# Patient Record
Sex: MALE | Race: WHITE | NOT HISPANIC OR LATINO | Employment: FULL TIME | URBAN - METROPOLITAN AREA
[De-identification: names, ages, dates, MRNs, and addresses within clinical notes are randomized per-mention and may not be internally consistent; named-entity substitution may affect disease eponyms.]

---

## 2017-03-06 ENCOUNTER — OFFICE VISIT (OUTPATIENT)
Dept: OTOLARYNGOLOGY | Facility: CLINIC | Age: 37
End: 2017-03-06

## 2017-03-06 VITALS — WEIGHT: 255 LBS | HEIGHT: 70 IN | BODY MASS INDEX: 36.51 KG/M2

## 2017-03-06 DIAGNOSIS — R09.81 NASAL CONGESTION: ICD-10-CM

## 2017-03-06 DIAGNOSIS — R49.0 DYSPHONIA: ICD-10-CM

## 2017-03-06 DIAGNOSIS — R07.0 THROAT DISCOMFORT: Primary | ICD-10-CM

## 2017-03-06 DIAGNOSIS — R49.0 DYSPHONIA: Primary | ICD-10-CM

## 2017-03-06 ASSESSMENT — PAIN SCALES - GENERAL: PAINLEVEL: NO PAIN (0)

## 2017-03-06 NOTE — LETTER
3/6/2017      RE: Jhon Gu  [address]    [place] [address]       Dear Colleague:    I had the pleasure of meeting Jhon Gu in consultation at the [facility] of the [facility] on a self-referred basis, for evaluation of dysphonia. The note from our visit follows.  I appreciate the opportunity to participate in the care of this pleasant patient.    Please feel free to contact me with any questions.    Sincerely yours,    Calli Steward M.D., M.P.H.  , Laryngology  Director, [facility]  Otolaryngology- Head & Neck Surgery  [phone]          =====    History of Present Illness:   Jhon Gu is a pleasant 36-year-old male galdamez who presents with a several month history of sinonasal congestion and dysphonia.     I last saw him in 2011 with post-upper respiratory infection symptoms. In the interim he was seen by Dr. 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    He also has concerns about sinonasal symptoms. He had sinus infections, in November and February, the first of which he managed conservatively, and the second of which was treated with Augmentin. Recently his primary care physician suggested resuming Flonase, and increasing Claritin dosing after he had a rash that was thought to potentially be related to the Augmentin (but not conclusively so). He still has a sense of congestion and nasal discomfort and is interested in getting further help on this, because it is affecting his singing.      Voice  He states his voice feels lower than it used to be. Both speaking and singing feel heavier, but not painful. Voice quality seems preserved, but there is some buzziness and some goopiness that he notices.      Swallowing  No concerns.       Cough/Throat-clearing  No concerns, although he sometimes needs to clear his throat due to  secretions.      Breathing  No concerns.       Throat discomfort  No concerns.       Reflux-type symptoms  He experiences heartburn/indigestion rarely. He is not taking reflux medications.      Prior EPIC records were reviewed for this visit.    MEDICATIONS:     Current Outpatient Prescriptions   Medication Sig Dispense Refill     Multiple Vitamins-Minerals (MULTIVITAMIN ADULT PO)        fluticasone (FLONASE) 50 MCG/ACT nasal spray Spray 2 sprays into both nostrils daily       loratadine (CLARITIN) 10 MG tablet Take 10 mg by mouth daily       ibuprofen (ADVIL) 200 MG tablet Take 200 mg by mouth every 4 hours as needed.         ALLERGIES:    Allergies   Allergen Reactions     Latex      Nkda [No Known Drug Allergies]      Penicillins Rash       PAST MEDICAL HISTORY:   Past Medical History   Diagnosis Date     Chronic sinusitis 11/16        PAST SURGICAL HISTORY: No past surgical history on file.    HABITS/SOCIAL HISTORY:    Social History   Substance Use Topics     Smoking status: Never Smoker     Smokeless tobacco: Never Used     Alcohol use Yes      Comment: 1-2 drinks twice weekly         FAMILY HISTORY:    Family History   Problem Relation Age of Onset     DIABETES Mother      Hypertension Mother        REVIEW OF SYSTEMS:  The patient completed a comprehensive 11 point review of systems (below), which was reviewed. Positives are as noted below; pertinent findings are as noted in the HPI.     Patient Supplied Answers to Review of Systems   ENT ROS 2/23/2017   Constitutional Unexplained fatigue, Problems with sleep   Neurology Headache   Ears, Nose, Throat Ringing/noise in ears, Nasal congestion or drainage, Sore throat   Cardiopulmonary Wheezing   Allergy/Immunology Allergies or hay fever       PHYSICAL EXAMINATION:  General: The patient was alert and conversant, and in no acute distress.    Eyes: PERRL, conjunctiva and lids normal, sclera nonicteric.  Nose: Anterior rhinoscopy: no gross abnormalities. no   bleeding; no  mucopurulence; septum grossly normal, mild to moderate mucoid drainage and/or crusting.  Oral cavity/oropharynx: No masses or lesions. Dentition in good condition. Floor of mouth and oral tongue soft to palpation. Tongue mobility and palate elevation intact and symmetric.  Ears: Normal auricles, external auditory canals bilaterally. Visualized portions of tympanic membranes normal bilaterally.   Neck: No palpable cervical lymphadenopathy. There was no significant tenderness to palpation of the thyrohyoid space. No obvious thyroid abnormality. Landmarks palpable.  Resp: Breathing comfortably, no stridor or stertor.  Neuro: Symmetric facial function. Other cranial nerves as documented above.  Psych: Normal affect, pleasant and cooperative.  Voice/speech: No significant dysphonia of speaking voice.  Extremities: No cyanosis, clubbing, or edema of the upper extremities.    Intake scores  Total Score for Last Patient-Answered VHI Questionnaire  VHI Total Score 2/23/2017   VHI Total Score 3     Total Score for Last Patient-Answered EAT Questionnaire  EAT Total Score 2/23/2017   EAT Total Score 0     Total Score for Last Patient-Answered CSI Questionnaire  CSI Total Score 2/23/2017   CSI Total Score 7         PROCEDURE:   Flexible fiberoptic laryngoscopy and laryngovideostroboscopy  Indications: This procedure was warranted to evaluate the patient's laryngeal function. Risks, benefits, and alternatives were discussed.  Description: After written informed consent was obtained, a time-out was performed to confirm patient identity, procedure, and procedure site. Topical 3% lidocaine with 0.25% phenylephrine was applied to the nasal cavities. I performed the endoscopy and no complications were apparent. Continuous and stroboscopic light were utilized to assess routine phonation and variable frequency phonation.  Performed by: Calli Steward MD MPH  SLP: Kira Mayers, PhD, CCC-SLP  Findings: Normal  nasopharynx. Normal base of tongue, valleculae, and epiglottis. Vocal fold mobility: right: normal; left: normal. Medial edges of the vocal folds: smooth and straight. No focal mucosal lesions were observed on the true vocal folds. Glissade produced appropriate elongation. There was mild supraglottic recruitment with connected speech. Mucosa of false vocal folds, aryepiglottic folds, piriform sinuses, and posterior glottis unremarkable. Airway was patent. No focal findings on NBI; mild mucosal thickening mid bilateral true vocal folds.    The addition of stroboscopy allowed evaluation of the mucosal wave.   Amplitude: right: normal; left: normal. Symmetry: good symmetry. Closure pattern: normal at modal, hourglass shaped at high pitches. Closure plane: at glottic level. Phase distribution: normal.      IMPRESSION AND PLAN:   Jhon Gu presents with a stable laryngeal exam and scattered thick, sticky secretions. My sense is that his sinonasal symptoms are associated with a post-acute-sinusitis gradual recovery, but because the symptoms are affecting his singing, I recommended that he continue the Flonase and do daily saline nasal rinses as well as gargles. I will also make a referral to Dr. Milton for rhinosinusitis, in the event that his symptoms persist. He is also concerned about the potential role of environmental allergies in his symptoms, and therefore wished to be seen by an allergist, so we will place that referral as well. I suggested that he avoid taking double doses of Claritin at this point, as his rash has resolved and I think the Claritin at this point is leading to more dryness and thickness of his secretions.     He will return as needed. I appreciate the opportunity to participate in the care of this pleasant patient.       Calli Steward MD

## 2017-03-06 NOTE — PATIENT INSTRUCTIONS
Plan of Care:  1. Please make an appt to see Dr. Milton for sinus congestion.  2. Please make an appt to see an allergist for possible allergy testing.    Clinic Information:  1. To schedule an appointment please call [phone], option 1  2. To talk to a Triage RN please call [phone] select option 3  3. To speak to Dr. Steward's nurse, Amira, please call [phone]    Amira Gill RN  [facility]   Head & Neck Surgery

## 2017-03-06 NOTE — MR AVS SNAPSHOT
After Visit Summary   3/6/2017    Jhon Gu    MRN: [id number]           Patient Information     Date Of Birth          1980        Visit Information        Provider Department      3/6/2017 1:20 PM Kira Mayers, SLP [facility]        Today's Diagnoses     Dysphonia    -  1       Follow-ups after your visit        Your next 10 appointments already scheduled     May 01, 2017  3:35 PM CDT   (Arrive by 3:20 PM)   New Allergy with Joon Gill MD   [facility] ([facility])    [address] [place] [id number] [phone]           Do not take anti-histamines or Zantac for seven day prior to your appointment.            May 01, 2017  5:00 PM CDT   (Arrive by 4:45 PM)   NEW NOSE with Jordyn Milton MD   [facility] ([facility])    [address] [place] [id number] [phone]              Who to contact     Please call your clinic at [phone] to:    Ask questions about your health    Make or cancel appointments    Discuss your medicines    Learn about your test results    Speak to your doctor   If you have compliments or concerns about an experience at your clinic, or if you wish to file a complaint, please contact [facility] Patient Relations at [phone] or email us at [email or web address]         Additional Information About Your Visit        [employer] Information     [employer] gives you secure access to your electronic health record. If you see a primary care provider, you can also send messages to your care team and make appointments. If you have questions, please call your primary care clinic.  If you do not have a primary care provider, please call [phone] and they will assist you.      [employer] is an electronic gateway that provides easy, online access to your medical  records. With [employer], you can request a clinic appointment, read your test results, renew a prescription or communicate with your care team.     To access your existing account, please contact your [facility] or call [phone] for assistance.        Care EveryWhere ID     This is your Care EveryWhere ID. This could be used by other organizations to access your [place] medical records  [id number]         Blood Pressure from Last 3 Encounters:   05/02/14 133/76    Weight from Last 3 Encounters:   03/06/17 115.7 kg (255 lb)   06/05/14 114.3 kg (252 lb)   04/24/14 111.6 kg (246 lb)              We Performed the Following     [facility]        Primary Care Provider Fax #    Leigha Jackman [phone]       [facility] [address] [address]        Thank you!     Thank you for choosing [employer]  for your care. Our goal is always to provide you with excellent care. Hearing back from our patients is one way we can continue to improve our services. Please take a few minutes to complete the written survey that you may receive in the mail after your visit with us. Thank you!             Your Updated Medication List - Protect others around you: Learn how to safely use, store and throw away your medicines at www.disposemymeds.org.          This list is accurate as of: 3/6/17 11:59 PM.  Always use your most recent med list.                   Brand Name Dispense Instructions for use    ADVIL 200 MG tablet   Generic drug:  ibuprofen      Take 200 mg by mouth every 4 hours as needed.       CLARITIN 10 MG tablet   Generic drug:  loratadine      Take 10 mg by mouth daily       fluticasone 50 MCG/ACT spray    FLONASE     Spray 2 sprays into both nostrils daily       MULTIVITAMIN ADULT PO

## 2017-03-06 NOTE — NURSING NOTE
Procedure: Upper aerodigestive tract endoscopy, Flexible or rigid laryngoscopy, possible stroboscopy, possible flexible endoscopic evaluation of swallowing   Reason: symptoms requiring examination   PREPROCEDURE:   Yes Patient ID verified with 2 identifiers (name and  or MRN)   Yes: Procedure and site verified with patient/designee (when able)   Yes: Accurate consent documentation in medical record   No: Marking not required. Reason: [ Procedure does not require site marking. ][ Provider is in continuous attendance with the patient from consent through completion of procedure. ][ Marking unable or refused by patient (see scanned diagram).   TIME OUT:   Yes: Time-Out performed immediately prior to starting procedure, including verbal and active participation of all team members addressing:   * Correct patient identity   * Confirmed that the correct side and site are marked   * An accurate procedure consent form   * Agreement on the procedure to be done   * Correct patient position   * Relevant images and results are properly labeled and appropriately displayed   * The need to administer antibiotics or fluids for irrigation purposes during the procedure as applicable   * Safety precations based on patient history or medication use   DURING PROCEDURE: Verification of correct person, site, and procedure occurs any time the responsibility for care of the patient is transferred to another member of the care team.   Amira Gill RN  P Otolaryngology/Head & Neck Surgery

## 2017-03-06 NOTE — PROGRESS NOTES
Dear Colleague:    I had the pleasure of meeting Jhon Gu in consultation at the [facility] of the [facility] on a self-referred basis, for evaluation of dysphonia. The note from our visit follows.  I appreciate the opportunity to participate in the care of this pleasant patient.    Please feel free to contact me with any questions.    Sincerely yours,    Calli Steward M.D., M.P.H.  , Laryngology  Director, [facility]  Otolaryngology- Head & Neck Surgery  [phone]          =====    History of Present Illness:   Jhon Gu is a pleasant 36-year-old male galdamez who presents with a several month history of sinonasal congestion and dysphonia.     I last saw him in 2011 with post-upper respiratory infection symptoms. In the interim he was seen by Dr. 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    He also has concerns about sinonasal symptoms. He had sinus infections, in November and February, the first of which he managed conservatively, and the second of which was treated with Augmentin. Recently his primary care physician suggested resuming Flonase, and increasing Claritin dosing after he had a rash that was thought to potentially be related to the Augmentin (but not conclusively so). He still has a sense of congestion and nasal discomfort and is interested in getting further help on this, because it is affecting his singing.      Voice  He states his voice feels lower than it used to be. Both speaking and singing feel heavier, but not painful. Voice quality seems preserved, but there is some buzziness and some goopiness that he notices.      Swallowing  No concerns.       Cough/Throat-clearing  No concerns, although he sometimes needs to clear his throat due to secretions.      Breathing  No concerns.       Throat discomfort  No concerns.       Reflux-type  symptoms  He experiences heartburn/indigestion rarely. He is not taking reflux medications.      Prior EPIC records were reviewed for this visit.    MEDICATIONS:     Current Outpatient Prescriptions   Medication Sig Dispense Refill     Multiple Vitamins-Minerals (MULTIVITAMIN ADULT PO)        fluticasone (FLONASE) 50 MCG/ACT nasal spray Spray 2 sprays into both nostrils daily       loratadine (CLARITIN) 10 MG tablet Take 10 mg by mouth daily       ibuprofen (ADVIL) 200 MG tablet Take 200 mg by mouth every 4 hours as needed.         ALLERGIES:    Allergies   Allergen Reactions     Latex      Nkda [No Known Drug Allergies]      Penicillins Rash       PAST MEDICAL HISTORY:   Past Medical History   Diagnosis Date     Chronic sinusitis 11/16        PAST SURGICAL HISTORY: No past surgical history on file.    HABITS/SOCIAL HISTORY:    Social History   Substance Use Topics     Smoking status: Never Smoker     Smokeless tobacco: Never Used     Alcohol use Yes      Comment: 1-2 drinks twice weekly         FAMILY HISTORY:    Family History   Problem Relation Age of Onset     DIABETES Mother      Hypertension Mother        REVIEW OF SYSTEMS:  The patient completed a comprehensive 11 point review of systems (below), which was reviewed. Positives are as noted below; pertinent findings are as noted in the HPI.     Patient Supplied Answers to Review of Systems   ENT ROS 2/23/2017   Constitutional Unexplained fatigue, Problems with sleep   Neurology Headache   Ears, Nose, Throat Ringing/noise in ears, Nasal congestion or drainage, Sore throat   Cardiopulmonary Wheezing   Allergy/Immunology Allergies or hay fever       PHYSICAL EXAMINATION:  General: The patient was alert and conversant, and in no acute distress.    Eyes: PERRL, conjunctiva and lids normal, sclera nonicteric.  Nose: Anterior rhinoscopy: no gross abnormalities. no  bleeding; no  mucopurulence; septum grossly normal, mild to moderate mucoid drainage and/or  crusting.  Oral cavity/oropharynx: No masses or lesions. Dentition in good condition. Floor of mouth and oral tongue soft to palpation. Tongue mobility and palate elevation intact and symmetric.  Ears: Normal auricles, external auditory canals bilaterally. Visualized portions of tympanic membranes normal bilaterally.   Neck: No palpable cervical lymphadenopathy. There was no significant tenderness to palpation of the thyrohyoid space. No obvious thyroid abnormality. Landmarks palpable.  Resp: Breathing comfortably, no stridor or stertor.  Neuro: Symmetric facial function. Other cranial nerves as documented above.  Psych: Normal affect, pleasant and cooperative.  Voice/speech: No significant dysphonia of speaking voice.  Extremities: No cyanosis, clubbing, or edema of the upper extremities.    Intake scores  Total Score for Last Patient-Answered VHI Questionnaire  VHI Total Score 2/23/2017   VHI Total Score 3     Total Score for Last Patient-Answered EAT Questionnaire  EAT Total Score 2/23/2017   EAT Total Score 0     Total Score for Last Patient-Answered CSI Questionnaire  CSI Total Score 2/23/2017   CSI Total Score 7         PROCEDURE:   Flexible fiberoptic laryngoscopy and laryngovideostroboscopy  Indications: This procedure was warranted to evaluate the patient's laryngeal function. Risks, benefits, and alternatives were discussed.  Description: After written informed consent was obtained, a time-out was performed to confirm patient identity, procedure, and procedure site. Topical 3% lidocaine with 0.25% phenylephrine was applied to the nasal cavities. I performed the endoscopy and no complications were apparent. Continuous and stroboscopic light were utilized to assess routine phonation and variable frequency phonation.  Performed by: Calli Steward MD MPH  SLP: Kira Mayers, PhD, CCC-SLP  Findings: Normal nasopharynx. Normal base of tongue, valleculae, and epiglottis. Vocal fold mobility: right:  normal; left: normal. Medial edges of the vocal folds: smooth and straight. No focal mucosal lesions were observed on the true vocal folds. Glissade produced appropriate elongation. There was mild supraglottic recruitment with connected speech. Mucosa of false vocal folds, aryepiglottic folds, piriform sinuses, and posterior glottis unremarkable. Airway was patent. No focal findings on NBI; mild mucosal thickening mid bilateral true vocal folds.    The addition of stroboscopy allowed evaluation of the mucosal wave.   Amplitude: right: normal; left: normal. Symmetry: good symmetry. Closure pattern: normal at modal, hourglass shaped at high pitches. Closure plane: at glottic level. Phase distribution: normal.      IMPRESSION AND PLAN:   Jhon Gu presents with a stable laryngeal exam and scattered thick, sticky secretions. My sense is that his sinonasal symptoms are associated with a post-acute-sinusitis gradual recovery, but because the symptoms are affecting his singing, I recommended that he continue the Flonase and do daily saline nasal rinses as well as gargles. I will also make a referral to Dr. Milton for rhinosinusitis, in the event that his symptoms persist. He is also concerned about the potential role of environmental allergies in his symptoms, and therefore wished to be seen by an allergist, so we will place that referral as well. I suggested that he avoid taking double doses of Claritin at this point, as his rash has resolved and I think the Claritin at this point is leading to more dryness and thickness of his secretions.     He will return as needed. I appreciate the opportunity to participate in the care of this pleasant patient.

## 2017-03-06 NOTE — MR AVS SNAPSHOT
After Visit Summary   3/6/2017    Jhon Gu    MRN: [id number]           Patient Information     Date Of Birth          1980        Visit Information        Provider Department      3/6/2017 1:20 PM Calli Steward MD [facility]        Today's Diagnoses     Throat discomfort    -  1    Dysphonia        Nasal congestion          Care Instructions    Plan of Care:  1. Please make an appt to see Dr. Milton for sinus congestion.  2. Please make an appt to see an allergist for possible allergy testing.    Clinic Information:  1. To schedule an appointment please call [phone], option 1  2. To talk to a Triage RN please call [phone] select option 3  3. To speak to Dr. Steward's nurse, Amira, please call [phone]    Amira Gill RN  [facility] ENT   Head & Neck Surgery             Follow-ups after your visit        Additional Services     ALLERGY/ASTHMA ADULT REFERRAL       Your provider has referred you to: Dr. Gill [facility]     Please be aware that coverage of these services is subject to the terms and limitations of your health insurance plan.  Call member services at your health plan with any benefit or coverage questions.      Please bring the following with you to your appointment:    (1) Any X-Rays, CTs or MRIs which have been performed.  Contact the facility where they were done to arrange for  prior to your scheduled appointment.    (2) List of current medications  (3) This referral request   (4) Any documents/labs given to you for this referral                  Your next 10 appointments already scheduled     May 01, 2017  3:35 PM CDT   (Arrive by 3:20 PM)   New Allergy with Joon Gill MD   [facility] ([facility])    [address] [place] [address] [phone]           Do not take anti-histamines or Zantac for seven day prior  "to your appointment.            May 01, 2017  5:00 PM CDT   (Arrive by 4:45 PM)   NEW NOSE with Jordyn Milton MD   [facility] ([facility] [place])    [address] [address] [phone]              Who to contact     Please call your clinic at [phone] to:    Ask questions about your health    Make or cancel appointments    Discuss your medicines    Learn about your test results    Speak to your doctor   If you have compliments or concerns about an experience at your clinic, or if you wish to file a complaint, please contact [facility] Patient Relations at [phone] or email us at [email or web address]         Additional Information About Your Visit        [employer] Information     Kalido gives you secure access to your electronic health record. If you see a primary care provider, you can also send messages to your care team and make appointments. If you have questions, please call your primary care clinic.  If you do not have a primary care provider, please call [phone] and they will assist you.      Kalido is an electronic gateway that provides easy, online access to your medical records. With Kalido, you can request a clinic appointment, read your test results, renew a prescription or communicate with your care team.     To access your existing account, please contact your [facility] or call [phone] for assistance.        Care EveryWhere ID     This is your Care EveryWhere ID. This could be used by other organizations to access your [facility] medical records  [id number]        Your Vitals Were     Height BMI (Body Mass Index)                1.79 m (5' 10.47\") 36.1 kg/m2           Blood Pressure from Last 3 Encounters:   05/02/14 133/76    Weight from Last 3 Encounters:   03/06/17 115.7 kg (255 lb)   06/05/14 114.3 kg (252 lb)   04/24/14 111.6 kg (246 lb) "              We Performed the Following     ALLERGY/ASTHMA ADULT REFERRAL     IMAGESTREAM RECORDING ORDER        Primary Care Provider Fax #    Leigha Jackman [phone]       [place] [address]        Thank you!     Thank you for choosing [facility]  for your care. Our goal is always to provide you with excellent care. Hearing back from our patients is one way we can continue to improve our services. Please take a few minutes to complete the written survey that you may receive in the mail after your visit with us. Thank you!             Your Updated Medication List - Protect others around you: Learn how to safely use, store and throw away your medicines at www.disposemymeds.org.          This list is accurate as of: 3/6/17  2:20 PM.  Always use your most recent med list.                   Brand Name Dispense Instructions for use    ADVIL 200 MG tablet   Generic drug:  ibuprofen      Take 200 mg by mouth every 4 hours as needed.       CLARITIN 10 MG tablet   Generic drug:  loratadine      Take 10 mg by mouth daily       fluticasone 50 MCG/ACT spray    FLONASE     Spray 2 sprays into both nostrils daily       MULTIVITAMIN ADULT PO

## 2017-03-21 NOTE — PROGRESS NOTES
"[facility] VOICE [facility]  Negro Camacho Jr., M.D., F.A.C.S.  Calli Steward M.D., M.P.H.  Kira Mayers, Ph.D., CCC/SLP  Naz Claire M.M. (voice), M.MARIAN., CCC/SLP  Kilo Maynard M.M. (voice), M.A., [facility]/SLP    [facility]  VOICE/SPEECH/BREATHING EVALUATION AND LARYNGEAL EXAMINATION REPORT    Patient: Jhon Gu  Date of Visit: 3/6/2017    HISTORY  PATIENT INFORMATION  Jhon Gu was seen for voice evaluation and laryngeal examination in conjunction with a visit to Dr. Steward today.  Please refer to Dr. Steward s dictation for addtional history and impressions.     DIAGNOSIS/REASON FOR REFERRAL  Dysphonia/ Evaluate, perform laryngeal exam, treat as appropriate    HISTORY OF VOICE DISORDER  Mr. Gu is a 36 year old galdamez with a history of dysphonia.  Salient details of his history are as follows:    Hx of dysphonia and treatment in this clinic in 2015; apparently no voice problems since then until recently    nasal congestion with voice consequences since November 2016  o Dx'd with sinus infection, but no medical treatment  o Apparently resolved for a while  o Recurrence of sinus infection in February; Rx'd Augmentin from Urgent Care clinic  o Seen by his PCP at [place] a week ago  - She increased his dose of Claritin and also recommended he return to use of Flonase  - He developed a rash and severe dysphonia as the Claritin was wearing off; very concerning to him  - The Claritin seems to be helping the congesting, but the drying effects are problematic    Concerned for health care in the presence of needing to maintain optimal vocal health    Currently voice is doing well despite last week's dysphonia; now concerned about treating the nasal congestion without affecting his voice  o Feels that his voice is now the victim of his congestions  o Voice is lower when he wakes up, and everything feel \"heavier\" but he believes it is due to congestion    CURRENT PATIENT COMPLAINTS    Primary: " dysphonia due to nasal congestion and drainage    Denies significant dyspnea, dysphagia, or pain    OTHER PERTINENT HISTORY    Unremarkable    OBJECTIVE FINDINGS  VOICE/ SPEECH/ NON-COMMUNICATIVE LARYNGEAL BEHAVIORS EVALUATION  An evaluation of the voice was accomplished today; salient features are as follows:    Breathing pattern:    Appears within normal limits and adequate     No overt tension is evident.    Voice quality is characterized by    Clear quality    No roughness, breathiness, or strain    Minimal glottal hare    Habitual pitch was not formally tested, but is judged to be higher than optimal, with a slightly unnatural use of tenor quality and high pitch range    Loudness is WNL and appropriate for the setting    Sustained vowels:     Comfortable pitch /a/: clear, resonant, robust; high pitch    A singing task shows voice quality is consistent between speech and singing, with a slight tongue-base tension and backward focus consistent with his style of singing    In a pitch glide task to determine pitch range, he demonstrates supranormal pitch range    CAPE-V Overall Severity:   0/100    LARYNGEAL EXAMINATION    Endoscopic laryngeal examination was performed by:  Calli Steward MD,   I provided technical support, and provided the protocol of instructions for the patient.  Type of exam:   Flexible endoscopy with chip-tip technology and stroboscopy  This exam shows:    Laryngeal and Vocal Fold Mucosa    Essentially healthy laryngeal mucosa; mildly dry    moderate presence of sticky, thickened secretions on the vocal folds and throughout the laryngeal area    Vocal fold mucosa is   o Essentially normal limits, with no lesions and straight vibratory margins    There is a very slight swelling on the right midfold that is visible only during stroboscopy at high pitch   o This is stable since 2015    Neurological and Functional Integrity of the Larynx    Vocal folds are mobile and meet at midline; movement is  brisk and symmetric; exam is neurologically normal     Elongation of the vocal folds for pitch increase is normal    On phonation, glottic closure is normal    Supraglottic Function and Therapy Probes    mild anterior-posterior constriction of the supraglottic larynx during connected speech    Stroboscopy provided the following additional information:    The mucosal wave is within normal limits in periodicity, amplitude, symmetry, and phase    There is a mild hourglass configuration to the glottis at high pitches due to the slight right midfold swelling; this is stable    Dr. Steward and I reviewed this laryngeal exam with Mr. Gu today, and I provided pertinent explanations, as well as written information:    Endoscopic findings are consistent with audio-perceptual assessment and patient Hx/complaints.    his symptoms are accounted for by the presence of thickened, sticky secretions throughout the laryngeal area as well as on the vocal folds     His singing is not at risk, and he is most likely to benefit from more directed therapy for his nasal congestion:    He should start regular saline nasal irrigation    He should continue flonase, but consider discontinuing Claritin due to the drying effect    He may benefit from seeing Dr. Milton  For further workup for nasal congestion, and Dr. Gill for allergy workup    Functional speech therapy is not warranted at this time    IMPRESSIONS/ RECOMMENDATIONS/ PLAN  IMPRESSIONS / RECOMMENDATIONS  Based on today's evaluation and laryngeal examination, it appears that:    Dysphonia is accounted for by the nasal congestion and resulting collection of secretions throughout the laryngeal area and on the vocal folds    The slight right midfold swelling is stable, and not of consequence    Continue Flonase and possibly discontinued Claritin are advised  o He should pursue regular saline nasal irrigation and saline gargle    No therapy is planned, therefore there are no goals  and Mr. Gu is discharged from this service.    PRIMARY ICD-10 code:  R49.0 (Dysphonia)    TOTAL SERVICE TIME: 45 minutes  EVALUATION OF VOICE AND RESONANCE: (43186): 45 minutes    NO CHARGE FACILITY FEE (76328)      Kira Mayers, Ph.D., [facility]-SLP  Speech-Language Pathologist  Director, [facility]  [phone]

## 2017-04-24 ENCOUNTER — CARE COORDINATION (OUTPATIENT)
Dept: PULMONOLOGY | Facility: CLINIC | Age: 37
End: 2017-04-24

## 2017-04-24 NOTE — PROGRESS NOTES
Writer called patient and reminded him to hold any antihistamines for 1 week prior to upcoming appointment with Dr. Gill. Patient verbalized understanding of this information.

## 2017-04-26 ENCOUNTER — PRE VISIT (OUTPATIENT)
Dept: PULMONOLOGY | Facility: CLINIC | Age: 37
End: 2017-04-26

## 2017-04-26 NOTE — TELEPHONE ENCOUNTER
1.  Date/reason for appt:5/1/17, Allergies  2.  Referring provider:EWA GREER  3.  Call to patient (Yes / No - short description):No, referred  4.  Previous care at / records requested from:   [facility]- office notes are in epic.

## 2017-05-01 ENCOUNTER — OFFICE VISIT (OUTPATIENT)
Dept: PULMONOLOGY | Facility: CLINIC | Age: 37
End: 2017-05-01
Attending: ALLERGY & IMMUNOLOGY
Payer: COMMERCIAL

## 2017-05-01 ENCOUNTER — OFFICE VISIT (OUTPATIENT)
Dept: OTOLARYNGOLOGY | Facility: CLINIC | Age: 37
End: 2017-05-01

## 2017-05-01 VITALS — WEIGHT: 253 LBS | HEIGHT: 72 IN | BODY MASS INDEX: 34.27 KG/M2

## 2017-05-01 VITALS
OXYGEN SATURATION: 97 % | SYSTOLIC BLOOD PRESSURE: 125 MMHG | HEART RATE: 90 BPM | RESPIRATION RATE: 16 BRPM | DIASTOLIC BLOOD PRESSURE: 76 MMHG

## 2017-05-01 DIAGNOSIS — R09.81 NASAL CONGESTION: ICD-10-CM

## 2017-05-01 DIAGNOSIS — J34.89 NASAL OBSTRUCTION: Primary | ICD-10-CM

## 2017-05-01 DIAGNOSIS — J30.1 SEASONAL ALLERGIC RHINITIS DUE TO POLLEN: Primary | ICD-10-CM

## 2017-05-01 PROCEDURE — 95024 IQ TESTS W/ALLERGENIC XTRCS: CPT | Performed by: ALLERGY & IMMUNOLOGY

## 2017-05-01 PROCEDURE — 95004 PERQ TESTS W/ALRGNC XTRCS: CPT | Performed by: ALLERGY & IMMUNOLOGY

## 2017-05-01 PROCEDURE — 99212 OFFICE O/P EST SF 10 MIN: CPT | Mod: ZF

## 2017-05-01 ASSESSMENT — PAIN SCALES - GENERAL
PAINLEVEL: NO PAIN (0)
PAINLEVEL: MILD PAIN (3)

## 2017-05-01 NOTE — MR AVS SNAPSHOT
After Visit Summary   2017    Jhon Gu    MRN: [id number]           Patient Information     Date Of Birth          1980        Visit Information        Provider Department      2017 5:00 PM Jordyn Milton MD [facility]        Care Instructions    Plan of care:  Stop the Flonase   Follow up with Dr Milton in 6-8 weeks  Clinic contact information:  1. To schedule an appointment call [phone], option 1  2. To talk to the Triage RN call [phone], option 3  3. If you need to speak to Hailey or get a message to your doctor on a Friday, call the triage RN  4. HaileyRN: [phone]  5. Surgery scheduling:      Leonila Calhoun: [phone]      Orlyariane Hookerter: [phone]  6. Fax: [phone]  7. [phone]          Follow-ups after your visit        Your next 10 appointments already scheduled     2017  8:00 AM CDT   (Arrive by 7:45 AM)   RETURN RHINOLOGY with MD MICHEAL Colindres [facility] ([facility] [place])    [address] [place] [address] [phone]              Who to contact     Please call your clinic at [phone] to:    Ask questions about your health    Make or cancel appointments    Discuss your medicines    Learn about your test results    Speak to your doctor   If you have compliments or concerns about an experience at your clinic, or if you wish to file a complaint, please contact [facility] Patient Relations at [phone] or email us at [email or web address]         Additional Information About Your Visit        MyChart Information     [employer] gives you secure access to your electronic health record. If you see a primary care provider, you can also send messages to your care team and make appointments. If you have questions, please call your primary care clinic.  If you do not have a primary care provider, please call  [phone] and they will assist you.      [employer] is an electronic gateway that provides easy, online access to your medical records. With [employer], you can request a clinic appointment, read your test results, renew a prescription or communicate with your care team.     To access your existing account, please contact your [facility] or call [phone] for assistance.        Care EveryWhere ID     This is your Care EveryWhere ID. This could be used by other organizations to access your [facility] medical records  [id number]        Your Vitals Were     Height BMI (Body Mass Index)                1.829 m (6') 34.31 kg/m2           Blood Pressure from Last 3 Encounters:   05/01/17 125/76   05/02/14 133/76    Weight from Last 3 Encounters:   05/01/17 114.8 kg (253 lb)   03/06/17 115.7 kg (255 lb)   06/05/14 114.3 kg (252 lb)              Today, you had the following     No orders found for display       Primary Care Provider Fax #    Leigha Jackman [phone]       [place] [address]        Thank you!     Thank you for choosing [facility]  for your care. Our goal is always to provide you with excellent care. Hearing back from our patients is one way we can continue to improve our services. Please take a few minutes to complete the written survey that you may receive in the mail after your visit with us. Thank you!             Your Updated Medication List - Protect others around you: Learn how to safely use, store and throw away your medicines at www.disposemymeds.org.          This list is accurate as of: 5/1/17  6:04 PM.  Always use your most recent med list.                   Brand Name Dispense Instructions for use    ADVIL 200 MG tablet   Generic drug:  ibuprofen      Take 200 mg by mouth every 4 hours as needed.       CLARITIN 10 MG tablet   Generic drug:  loratadine      Take 10 mg by mouth daily       fluticasone 50 MCG/ACT  spray    FLONASE     Spray 2 sprays into both nostrils daily       MULTIVITAMIN ADULT PO

## 2017-05-01 NOTE — LETTER
2017       RE: Jhon Gu  [address]    [place] [address]     Dear Colleague,    Thank you for referring your patient, Jhon Gu, to the [facility] at [facility]. Please see a copy of my visit note below.      Otolaryngology Adult Consultation    Patient: Jhon Gu   : 1980     Patient presents with:  Consult:   Chief Complaint   Patient presents with     Consult     sinus pressure        Referring Provider: Calli Steward   Consulting Physician:  Jordyn Milton MD       Assessment/Plan: :  Jhon has had a fairly frustrating year with five to six bouts of sinus inflammation and pressure.  He currently is on the upswing with regards to his symptoms and is beginning to recover, although he is not completely at his baseline.  He also has longstanding chronic nasal obstruction.  We tried to separate out these issues and also tried to determine how these issues may be intertwined.  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  I did review a CT scan of his sinuses that was performed in  which demonstrated normal anatomy and no evidence of sinus disease.  At this point in time, I do not think it would be helpful to get a repeat CT scan.  I do, however, feel that if his symptoms escalate again in the fall, we may want to consider getting a CT scan at that time for further evaluation and try to be more preemptive about his symptoms in the fall so he does not have as difficult of a winter next year.  Jhon does seem to understand this, but is somewhat disappointed.  I think that he also is bothered by his longstanding nasal obstruction.  I do think that taking a break from his Flonase may be beneficial for him.  We will have him hold off on that for a period of time to see if improved nasal  moisturization may give him some relief in the feeling of pressure and the thick drainage that he experiences.  I am very happy to see him back, especially if he begins to have increased problems with his sinuses and at the time we may consider a CT scan.  He also will continue with his saline irrigations.      ISMA/jorden            HPI: Jhon Gu is a 37 year old male seen today in the Otolaryngology Clinic for subacute sinus symptoms and chronic nasal symptoms.  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  He had five to six sinus infections this past year.  He describes this as sinus pressure, difficulty blowing his nose and a lot of thick postnasal drainage.  He has undergone an allergy evaluation in the past which demonstrates very minimal response to allergens and in the last five to six years in the winter months he has experienced increased symptoms.  This past year was the worst.  He is beginning to feel a bit better.  He has been trying Flonase for a long time.  He uses nasal saline irrigations for many years.  He has tried Claritin in the past but knows that it may be a drying agent, so he stays away from that as much as possible.  Other than the increased infections this past year, he has also experienced chronic nasal obstruction.  It never feels like he is be able to free breathe freely through his nose and when he sings he tends to breathe through his mouth because of the difficulty of getting air through his nose.  He has had ongoing routine health care and is otherwise quite healthy and has no other current health concerns.           Current Outpatient Prescriptions on File Prior to Visit:  Multiple Vitamins-Minerals (MULTIVITAMIN ADULT PO)    fluticasone (FLONASE) 50 MCG/ACT nasal spray Spray 2 sprays into both nostrils daily   loratadine (CLARITIN) 10 MG tablet Take  10 mg by mouth daily   ibuprofen (ADVIL) 200 MG tablet Take 200 mg by mouth every 4 hours as needed.     No current facility-administered medications on file prior to visit.        Allergies   Allergen Reactions     Latex      Nkda [No Known Drug Allergies]      Penicillins Rash       Past Medical History:   Diagnosis Date     Chronic sinusitis 11/16         Review of Systems   ENT ROS 4/17/2017   Constitutional Unexplained fatigue, Problems with sleep   Neurology -   Psychology Frequently feeling anxious   Ears, Nose, Throat -   Cardiopulmonary Cough   Allergy/Immunology Allergies or hay fever, Rash        14 point ROS neg other than the symptoms noted above.    Physical Exam:    General Assessment   The patient is alert, oriented and in no acute distress.   Head/Face/Scalp  Grossly normal.   Ears  Normal canals, auricles and tympanic membranes.   Nose  External nose is straight, skin is normal. Intranasal exam (anterior rhinoscopy) reveals normal moist mucosa, turbinate tissue without edema, erythema or crusting.  Septum mainly in the midline.    Mouth  Oral cavity shows healthy mucosa with out ulceration, masses or other lesions involving the tongue, palate, buccal mucosa, floor of mouth or gingiva.  Dentition in good repair.  Oropharynx with normal tonsils, posterior wall and base of tongue.  Neck  No significant adenopathy, thyroid or salivary gland abnormality.         SNOT20:  Average and Sum Patient-Supplied Answers for the SNOT-20 4/25/2017   Total SNOT-20 Score (Average) 1.36   SNOT Score: Sum of responses 26           Again, thank you for allowing me to participate in the care of your patient.      Sincerely,    Jordyn Milton MD

## 2017-05-01 NOTE — PATIENT INSTRUCTIONS
Plan of care:  Stop the Flonase   Follow up with Dr Milton in 6-8 weeks  Clinic contact information:  1. To schedule an appointment call [phone], option 1  2. To talk to the Triage RN call [phone], option 3  3. If you need to speak to Hailey or get a message to your doctor on a Friday, call the triage RN  4. HaileyRN: [phone]  5. Surgery scheduling:      Leonila Calhoun: [phone]      Orly Montelongo: [phone]  6. Fax: [phone]  7. [phone]

## 2017-05-01 NOTE — PATIENT INSTRUCTIONS
"Around July 1 try to slowly wean off the antihistamines so go down 1 mg every 5 days.           Pollen Control Measures      * Keep windows and doors shut and run air conditioner at all times. This keeps outdoor air outside.    * Keep windows closed while in the car and air conditioner on the \"re-circulate\" mode.    * Wash your hair before going to bed at night to reduce exposure during sleep.    * Keep pets outdoors to prevent the pollen from being brought in on their hair.    * Avoid hanging clothes and linens outside as pollens may collect on the items.     * Don't mow the lawn if you are allergic to grass or weeds. If you must mow the grass, wear a face mask.       Spring: Tree Pollen    Summer: Grass Pollen and Mold    Fall: Weed Pollen and Mold      *All information has been reviewed, updated and approved by:  Dr. Joon Gill-C[facility] for [facility] Science & Health at [facility] updated: 11/2016    DUST MITE ALLERGY  Dust mites are microscopic bugs that live in dust, feeding on dead skin from our bodies. Dust mites flourish in warm, humid environments and therefore are highest  in number in carpeting, pillows and mattresses.     Tips:    Encase pillows, mattresses, and box springs in zippered allergy proof covers.    Wash all bed linens in at least 1300 F every week.    Remove stuffed animals or freeze them every other week.     Remove upholstered furniture from the bedroom and consider removing the carpet.     Keep ceiling fans off in the bedroom as they can stir up dust mite allergens.    Frequently dust and vacuum the house, especially the bedroom.    Acaracides (chemicals which kill mites) are available for use in the home. These acaracides require repeated applications to remain effective and may irritate asthmatics. It is still unclear how much, if any clinical benefit is gained by the use of acaracides. Therefore these agents are usually not recommended for initial mite control.        *All information has " been reviewed, updated and approved by:  Dr. Joon [facility] for Lung Science & Health at [facility] updated: 11/2016

## 2017-05-01 NOTE — PROGRESS NOTES
Reason for Visit  Jhon Gu is a 37 year old male who is referred by Leigha Jackman for congestion.    Allergy HPI  HISTORY OF PRESENT ILLNESS:  Balaji presents today for initial consultation regarding concern of nasal congestion.  He took Claritin for about 15 or 20 years and this week his skin is very itchy off of it.  He also gets occasional sinus infections.  At the end of January he was prescribed Augmentin.  Eight days into it he developed hives that lasted a few weeks.  His voice also became very hoarse.  Latex has also caused rash.  No fruits make his mouth itch.  He also has been on Flonase frequently.  He denies any itchy eyes, itchy nose or sneezing.  He is scheduled to see ENT later today.       SOCIAL HISTORY:  He does have a cat.  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  He does not smoke.       FAMILY HISTORY:  He has a brother and sister with penicillin allergy.        PAST MEDICAL HISTORY:  He states he is otherwise healthy.         The patient was seen and examined by Joon Mathews MD   Current Outpatient Prescriptions   Medication     Multiple Vitamins-Minerals (MULTIVITAMIN ADULT PO)     fluticasone (FLONASE) 50 MCG/ACT nasal spray     loratadine (CLARITIN) 10 MG tablet     ibuprofen (ADVIL) 200 MG tablet     No current facility-administered medications for this visit.      Allergies   Allergen Reactions     Latex      Nkda [No Known Drug Allergies]      Penicillins Rash     Social History     Social History     Marital status: Single     Spouse name: N/A     Number of children: N/A     Years of education: N/A     Occupational History     Not on file.     Social History Main Topics     Smoking status: Never Smoker     Smokeless tobacco: Never Used     Alcohol use Yes      Comment: 1-2 drinks twice weekly     Drug use: No     Sexual activity: No     Other Topics Concern     Not on file     Social History Narrative     Past Medical History:   Diagnosis Date      Chronic sinusitis 11/16     No past surgical history on file.  Family History   Problem Relation Age of Onset     DIABETES Mother      Hypertension Mother          ROS   A complete ROS was otherwise negative except as noted in the HPI and the end of the note.  /76 (BP Location: Right arm, Patient Position: Chair, Cuff Size: Adult Large)  Pulse 90  Resp 16  SpO2 97%  Exam:   GENERAL APPEARANCE: Well developed, well nourished, alert, and in no apparent distress.  EYES: PERRL, EOMI, conjunctiva clear non-injected  HENT: Nasal mucosa with mild edema and scant clear discharge. No nasal polyps.    EARS: Canals clear, TMs normal  MOUTH: Oral mucosa is moist, without any lesions, no tonsillar enlargement, no oropharyngeal exudate.  RESP: Good air flow throughout.  No crackles. No rhonchi. No wheezes.  CV: Normal S1, S2, regular rhythm, normal rate. No murmur.  No rub. No gallop. No LE edema.   MS: Extremities normal. No clubbing. No cyanosis.  SKIN: No rashes noted  NEURO: Speech normal, normal strength and tone, normal gait and stance  PSYCH: Normal mentation, orientation to person, place, and time.  Results: 38 percutaneous environmental allergen and 3 intradermal (and 2 controls) extracts placed by MA and read by MD and very mild dust mite and dog sensitivity noted as well as high birch tree pollen.        Assessment and plan: Balaji has nasal congestion and IgE mediated skin testing was mildly positive to allergens.  With the low positives I am not convinced this is all allergy related.  It is good that ENT is going to evaluate him today and if no problems I can see him back after 3 months.  If they note concerns I can see him as needed.  I am going to recommend he go off the antihistamines after the spring for the Birch allergy.  Nasal sprays per ENT recs today.          Answers for HPI/ROS submitted by the patient on 4/17/2017   General Symptoms: No  Skin Symptoms: No  HENT Symptoms: No  EYE SYMPTOMS:  No  HEART SYMPTOMS: No  LUNG SYMPTOMS: No  INTESTINAL SYMPTOMS: No  URINARY SYMPTOMS: No  REPRODUCTIVE SYMPTOMS: No  SKELETAL SYMPTOMS: No  BLOOD SYMPTOMS: No  NERVOUS SYSTEM SYMPTOMS: No  MENTAL HEALTH SYMPTOMS: No

## 2017-05-01 NOTE — NURSING NOTE
Chief Complaint   Patient presents with     Allergy Consult     Patient is being seen for consultaiton of allergies      Juliana Ennis CMA at 2:58 PM on 5/1/2017'

## 2017-05-01 NOTE — MR AVS SNAPSHOT
"              After Visit Summary   5/1/2017    Jhon Gu    MRN: [id number]           Patient Information     Date Of Birth          1980        Visit Information        Provider Department      5/1/2017 3:35 PM Joon Gill MD [facility]        Today's Diagnoses     Nasal congestion          Care Instructions    Around July 1 try to slowly wean off the antihistamines so go down 1 mg every 5 days.           Pollen Control Measures      * Keep windows and doors shut and run air conditioner at all times. This keeps outdoor air outside.    * Keep windows closed while in the car and air conditioner on the \"re-circulate\" mode.    * Wash your hair before going to bed at night to reduce exposure during sleep.    * Keep pets outdoors to prevent the pollen from being brought in on their hair.    * Avoid hanging clothes and linens outside as pollens may collect on the items.     * Don't mow the lawn if you are allergic to grass or weeds. If you must mow the grass, wear a face mask.       Spring: Tree Pollen    Summer: Grass Pollen and Mold    Fall: Weed Pollen and Mold      *All information has been reviewed, updated and approved by:  Dr. Joon Gill-[facility] for Lung Science & Health at [facility] updated: 11/2016    DUST MITE ALLERGY  Dust mites are microscopic bugs that live in dust, feeding on dead skin from our bodies. Dust mites flourish in warm, humid environments and therefore are highest  in number in carpeting, pillows and mattresses.     Tips:    Encase pillows, mattresses, and box springs in zippered allergy proof covers.    Wash all bed linens in at least 1300 F every week.    Remove stuffed animals or freeze them every other week.     Remove upholstered furniture from the bedroom and consider removing the carpet.     Keep ceiling fans off in the bedroom as they can stir up dust mite allergens.    Frequently dust and vacuum the house, especially the " bedroom.    Acaracides (chemicals which kill mites) are available for use in the home. These acaracides require repeated applications to remain effective and may irritate asthmatics. It is still unclear how much, if any clinical benefit is gained by the use of acaracides. Therefore these agents are usually not recommended for initial mite control.        *All information has been reviewed, updated and approved by:  Dr. Joon [facility] for [facility] at [facility] updated: 11/2016        Follow-ups after your visit        Follow-up notes from your care team     Return if symptoms worsen or fail to improve.      Your next 10 appointments already scheduled     May 01, 2017  5:00 PM CDT   (Arrive by 4:45 PM)   NEW RHINOLOGY with Jordyn Milton MD   [facility] ([facility])    [address] [place] [id number] [phone]              Who to contact     If you have questions or need follow up information about today's clinic visit or your schedule please contact [facility] directly at [phone].  Normal or non-critical lab and imaging results will be communicated to you by JumpChathart, letter or phone within 4 business days after the clinic has received the results. If you do not hear from us within 7 days, please contact the clinic through JumpChathart or phone. If you have a critical or abnormal lab result, we will notify you by phone as soon as possible.  Submit refill requests through IDMission or call your pharmacy and they will forward the refill request to us. Please allow 3 business days for your refill to be completed.          Additional Information About Your Visit        JumpChatharScanÃ¢â‚¬Â¢Jour Information     IDMission gives you secure access to your electronic health record. If you see a primary care provider, you can also send messages to your care team and make appointments. If you have questions, please call your  primary care clinic.  If you do not have a primary care provider, please call [phone] and they will assist you.        Care EveryWhere ID     This is your Care EveryWhere ID. This could be used by other organizations to access your [place] medical records  [id number]        Your Vitals Were     Pulse Respirations Pulse Oximetry             90 16 97%          Blood Pressure from Last 3 Encounters:   05/01/17 125/76   05/02/14 133/76    Weight from Last 3 Encounters:   03/06/17 115.7 kg (255 lb)   06/05/14 114.3 kg (252 lb)   04/24/14 111.6 kg (246 lb)              Today, you had the following     No orders found for display       Primary Care Provider Fax #    Leigha Jackman [phone]       [facility] [address] [address]        Thank you!     Thank you for choosing [facility]  for your care. Our goal is always to provide you with excellent care. Hearing back from our patients is one way we can continue to improve our services. Please take a few minutes to complete the written survey that you may receive in the mail after your visit with us. Thank you!             Your Updated Medication List - Protect others around you: Learn how to safely use, store and throw away your medicines at www.disposemymeds.org.          This list is accurate as of: 5/1/17  4:12 PM.  Always use your most recent med list.                   Brand Name Dispense Instructions for use    ADVIL 200 MG tablet   Generic drug:  ibuprofen      Take 200 mg by mouth every 4 hours as needed.       CLARITIN 10 MG tablet   Generic drug:  loratadine      Take 10 mg by mouth daily       fluticasone 50 MCG/ACT spray    FLONASE     Spray 2 sprays into both nostrils daily       MULTIVITAMIN ADULT PO

## 2017-05-01 NOTE — NURSING NOTE
Chief Complaint   Patient presents with     Consult     sinus pressure     Taniya Corona Medical Assistant

## 2017-05-01 NOTE — LETTER
5/1/2017       RE: Jhon Gu  [address]    [place] [address]     Dear Colleague,    Thank you for referring your patient, Jhon Gu, to the [facility] at [facility]. Please see a copy of my visit note below.    Reason for Visit  Jhon Gu is a 37 year old male who is referred by Leigha Jackman for congestion.    Allergy HPI  HISTORY OF PRESENT ILLNESS:  Balaji presents today for initial consultation regarding concern of nasal congestion.  He took Claritin for about 15 or 20 years and this week his skin is very itchy off of it.  He also gets occasional sinus infections.  At the end of January he was prescribed Augmentin.  Eight days into it he developed hives that lasted a few weeks.  His voice also became very hoarse.  Latex has also caused rash.  No fruits make his mouth itch.  He also has been on Flonase frequently.  He denies any itchy eyes, itchy nose or sneezing.  He is scheduled to see ENT later today.       SOCIAL HISTORY:  He does have a cat.  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  He does not smoke.       FAMILY HISTORY:  He has a brother and sister with penicillin allergy.        PAST MEDICAL HISTORY:  He states he is otherwise healthy.         The patient was seen and examined by Joon Mathews MD   Current Outpatient Prescriptions   Medication     Multiple Vitamins-Minerals (MULTIVITAMIN ADULT PO)     fluticasone (FLONASE) 50 MCG/ACT nasal spray     loratadine (CLARITIN) 10 MG tablet     ibuprofen (ADVIL) 200 MG tablet     No current facility-administered medications for this visit.      Allergies   Allergen Reactions     Latex      Nkda [No Known Drug Allergies]      Penicillins Rash     Social History     Social History     Marital status: Single     Spouse name: N/A     Number of children: N/A     Years of education: N/A     Occupational History      Not on file.     Social History Main Topics     Smoking status: Never Smoker     Smokeless tobacco: Never Used     Alcohol use Yes      Comment: 1-2 drinks twice weekly     Drug use: No     Sexual activity: No     Other Topics Concern     Not on file     Social History Narrative     Past Medical History:   Diagnosis Date     Chronic sinusitis 11/16     No past surgical history on file.  Family History   Problem Relation Age of Onset     DIABETES Mother      Hypertension Mother          ROS   A complete ROS was otherwise negative except as noted in the HPI and the end of the note.  /76 (BP Location: Right arm, Patient Position: Chair, Cuff Size: Adult Large)  Pulse 90  Resp 16  SpO2 97%  Exam:   GENERAL APPEARANCE: Well developed, well nourished, alert, and in no apparent distress.  EYES: PERRL, EOMI, conjunctiva clear non-injected  HENT: Nasal mucosa with mild edema and scant clear discharge. No nasal polyps.    EARS: Canals clear, TMs normal  MOUTH: Oral mucosa is moist, without any lesions, no tonsillar enlargement, no oropharyngeal exudate.  RESP: Good air flow throughout.  No crackles. No rhonchi. No wheezes.  CV: Normal S1, S2, regular rhythm, normal rate. No murmur.  No rub. No gallop. No LE edema.   MS: Extremities normal. No clubbing. No cyanosis.  SKIN: No rashes noted  NEURO: Speech normal, normal strength and tone, normal gait and stance  PSYCH: Normal mentation, orientation to person, place, and time.  Results: 38 percutaneous environmental allergen and 3 intradermal (and 2 controls) extracts placed by MA and read by MD and very mild dust mite and dog sensitivity noted as well as high birch tree pollen.        Assessment and plan: Balaji has nasal congestion and IgE mediated skin testing was mildly positive to allergens.  With the low positives I am not convinced this is all allergy related.  It is good that ENT is going to evaluate him today and if no problems I can see him back after 3  months.  If they note concerns I can see him as needed.  I am going to recommend he go off the antihistamines after the spring for the Birch allergy.  Nasal sprays per ENT recs today.      Again, thank you for allowing me to participate in the care of your patient.      Sincerely,    Joon Mathews MD

## 2017-05-03 NOTE — PROGRESS NOTES
Otolaryngology Adult Consultation    Patient: Jhon Gu   : 1980     Patient presents with:  Consult:   Chief Complaint   Patient presents with     Consult     sinus pressure        Referring Provider: Calli Steward   Consulting Physician:  Jordyn Milton MD       Assessment/Plan: :  Jhon has had a fairly frustrating year with five to six bouts of sinus inflammation and pressure.  He currently is on the upswing with regards to his symptoms and is beginning to recover, although he is not completely at his baseline.  He also has longstanding chronic nasal obstruction.  We tried to separate out these issues and also tried to determine how these issues may be intertwined.  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  I did review a CT scan of his sinuses that was performed in  which demonstrated normal anatomy and no evidence of sinus disease.  At this point in time, I do not think it would be helpful to get a repeat CT scan.  I do, however, feel that if his symptoms escalate again in the fall, we may want to consider getting a CT scan at that time for further evaluation and try to be more preemptive about his symptoms in the fall so he does not have as difficult of a winter next year.  Jhon does seem to understand this, but is somewhat disappointed.  I think that he also is bothered by his longstanding nasal obstruction.  I do think that taking a break from his Flonase may be beneficial for him.  We will have him hold off on that for a period of time to see if improved nasal moisturization may give him some relief in the feeling of pressure and the thick drainage that he experiences.  I am very happy to see him back, especially if he begins to have increased problems with his sinuses and at the time we may consider a CT scan.  He also will continue with his saline irrigations.      ISMA/jorden             HPI: Jhon Gu is a 37 year old male seen today in the Otolaryngology Clinic for subacute sinus symptoms and chronic nasal symptoms.  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  He had five to six sinus infections this past year.  He describes this as sinus pressure, difficulty blowing his nose and a lot of thick postnasal drainage.  He has undergone an allergy evaluation in the past which demonstrates very minimal response to allergens and in the last five to six years in the winter months he has experienced increased symptoms.  This past year was the worst.  He is beginning to feel a bit better.  He has been trying Flonase for a long time.  He uses nasal saline irrigations for many years.  He has tried Claritin in the past but knows that it may be a drying agent, so he stays away from that as much as possible.  Other than the increased infections this past year, he has also experienced chronic nasal obstruction.  It never feels like he is be able to free breathe freely through his nose and when he sings he tends to breathe through his mouth because of the difficulty of getting air through his nose.  He has had ongoing routine health care and is otherwise quite healthy and has no other current health concerns.           Current Outpatient Prescriptions on File Prior to Visit:  Multiple Vitamins-Minerals (MULTIVITAMIN ADULT PO)    fluticasone (FLONASE) 50 MCG/ACT nasal spray Spray 2 sprays into both nostrils daily   loratadine (CLARITIN) 10 MG tablet Take 10 mg by mouth daily   ibuprofen (ADVIL) 200 MG tablet Take 200 mg by mouth every 4 hours as needed.     No current facility-administered medications on file prior to visit.        Allergies   Allergen Reactions     Latex      Nkda [No Known Drug Allergies]      Penicillins Rash       Past Medical History:   Diagnosis  Date     Chronic sinusitis 11/16         Review of Systems   ENT ROS 4/17/2017   Constitutional Unexplained fatigue, Problems with sleep   Neurology -   Psychology Frequently feeling anxious   Ears, Nose, Throat -   Cardiopulmonary Cough   Allergy/Immunology Allergies or hay fever, Rash        14 point ROS neg other than the symptoms noted above.    Physical Exam:    General Assessment   The patient is alert, oriented and in no acute distress.   Head/Face/Scalp  Grossly normal.   Ears  Normal canals, auricles and tympanic membranes.   Nose  External nose is straight, skin is normal. Intranasal exam (anterior rhinoscopy) reveals normal moist mucosa, turbinate tissue without edema, erythema or crusting.  Septum mainly in the midline.    Mouth  Oral cavity shows healthy mucosa with out ulceration, masses or other lesions involving the tongue, palate, buccal mucosa, floor of mouth or gingiva.  Dentition in good repair.  Oropharynx with normal tonsils, posterior wall and base of tongue.  Neck  No significant adenopathy, thyroid or salivary gland abnormality.         SNOT20:  Average and Sum Patient-Supplied Answers for the SNOT-20 4/25/2017   Total SNOT-20 Score (Average) 1.36   SNOT Score: Sum of responses 26

## 2017-07-12 ENCOUNTER — OFFICE VISIT (OUTPATIENT)
Dept: OTOLARYNGOLOGY | Facility: CLINIC | Age: 37
End: 2017-07-12

## 2017-07-12 DIAGNOSIS — J34.89 NASAL LESION: Primary | ICD-10-CM

## 2017-07-12 RX ORDER — MUPIROCIN 20 MG/G
OINTMENT TOPICAL
Qty: 22 G | Refills: 0 | Status: SHIPPED | OUTPATIENT
Start: 2017-07-12 | End: 2017-07-22

## 2017-07-12 ASSESSMENT — PAIN SCALES - GENERAL: PAINLEVEL: NO PAIN (0)

## 2017-07-12 NOTE — PROGRESS NOTES
Patient seen previously for recurrent acute sinus issues and chronic obstruction.  Stopped flonase due to drying.  No change in symptoms.  Still feels that nasal obstruction affects singing.     Exam:  Mildly dry thick secretions.  Septum with spur on left. Slightly large right inferior turbinate. Mild vestibulitis.    A/P:  Chronic nasal obstruction, continue conservative management in patient with vocal career. Does benefit from breathe right strips, use those.  Increase nasal hydration with saline spray.  Use mupirocin for vestibulitis.

## 2017-07-12 NOTE — LETTER
7/12/2017       RE: John Gu  [address]    [place] [address]     Dear Colleague,    Thank you for referring your patient, Jhon Gu, to the [facility] at [facility]. Please see a copy of my visit note below.    Patient seen previously for recurrent acute sinus issues and chronic obstruction.  Stopped flonase due to drying.  No change in symptoms.  Still feels that nasal obstruction affects singing.     Exam:  Mildly dry thick secretions.  Septum with spur on left. Slightly large right inferior turbinate. Mild vestibulitis.    A/P:  Chronic nasal obstruction, continue conservative management in patient with vocal career. Does benefit from breathe right strips, use those.  Increase nasal hydration with saline spray.  Use mupirocin for vestibulitis.    Again, thank you for allowing me to participate in the care of your patient.      Sincerely,    Jordyn Milton MD

## 2017-07-12 NOTE — MR AVS SNAPSHOT
After Visit Summary   2017    Jhon Gu    MRN: [id number]           Patient Information     Date Of Birth          1980        Visit Information        Provider Department      2017 8:00 AM Jordyn Milton MD [facility]        Today's Diagnoses     Nasal lesion    -  1      Care Instructions    Plan of care:  Use Mupirocin/Bactroban ointment as instructed  Try Arm and Hammer Simply Saline, daily  Clinic contact information:  1. To schedule an appointment call [phone], option 1  2. To talk to the Triage RN call [phone], option 3  3. If you need to speak to Hailey or get a message to your doctor on a Friday, call the triage RN  4. HaileyRN: [phone]  5. Surgery scheduling:      Leonila Calhoun: [phone]      Orly Montelongo: [phone]  6. Fax: [phone]  7. [phone]            Follow-ups after your visit        Who to contact     Please call your clinic at [phone] to:    Ask questions about your health    Make or cancel appointments    Discuss your medicines    Learn about your test results    Speak to your doctor   If you have compliments or concerns about an experience at your clinic, or if you wish to file a complaint, please contact [facility] Patient Relations at [phone] or email us at [email or web address]         Additional Information About Your Visit        MyChart Information     [employer] gives you secure access to your electronic health record. If you see a primary care provider, you can also send messages to your care team and make appointments. If you have questions, please call your primary care clinic.  If you do not have a primary care provider, please call [phone] and they will assist you.      [employer] is an electronic gateway that provides easy, online access to your medical records. With [employer], you can request a clinic appointment, read your test results, renew a  prescription or communicate with your care team.     To access your existing account, please contact your [facility] or call [phone] for assistance.        Care EveryWhere ID     This is your Care EveryWhere ID. This could be used by other organizations to access your [place] medical records  [id number]         Blood Pressure from Last 3 Encounters:   05/01/17 125/76   05/02/14 133/76    Weight from Last 3 Encounters:   05/01/17 114.8 kg (253 lb)   03/06/17 115.7 kg (255 lb)   06/05/14 114.3 kg (252 lb)              Today, you had the following     No orders found for display         Today's Medication Changes          These changes are accurate as of: 7/12/17  8:44 AM.  If you have any questions, ask your nurse or doctor.               Start taking these medicines.        Dose/Directions    mupirocin 2 % ointment   Commonly known as:  BACTROBAN   Used for:  Nasal lesion   Started by:  Jordyn Milton MD        Apply a small amount to affected nostril(s) 2 times a day for 7days then 2-3 times week, then as needed   Quantity:  22 g   Refills:  0            Where to get your medicines      These medications were sent to [place] [id number]     Phone:  [phone]     mupirocin 2 % ointment                Primary Care Provider Fax #    Leigha Jackman [phone]       [facility] [address] [address]        Equal Access to Services     DARRIAN CARBAJAL AH: Hadii lisbeth ku hadasho Soomaali, waaxda luqadaha, qaybta kaalmada adeegyada, wax kayla coker. So [facility] [phone].    ATENCIÓN: Si habla español, tiene a booth disposición servicios gratuitos de asistencia lingüística. Llame al [phone].    We comply with applicable federal civil rights laws and [place] laws. We do not discriminate on the basis of race, color, national origin, age, disability sex,  sexual orientation or gender identity.            Thank you!     Thank you for choosing [facility]  for your care. Our goal is always to provide you with excellent care. Hearing back from our patients is one way we can continue to improve our services. Please take a few minutes to complete the written survey that you may receive in the mail after your visit with us. Thank you!             Your Updated Medication List - Protect others around you: Learn how to safely use, store and throw away your medicines at www.disposemymeds.org.          This list is accurate as of: 7/12/17  8:44 AM.  Always use your most recent med list.                   Brand Name Dispense Instructions for use Diagnosis    ADVIL 200 MG tablet   Generic drug:  ibuprofen      Take 200 mg by mouth every 4 hours as needed.        CLARITIN 10 MG tablet   Generic drug:  loratadine      Take 10 mg by mouth daily        fluticasone 50 MCG/ACT spray    FLONASE     Spray 2 sprays into both nostrils daily        MULTIVITAMIN ADULT PO           mupirocin 2 % ointment    BACTROBAN    22 g    Apply a small amount to affected nostril(s) 2 times a day for 7days then 2-3 times week, then as needed    Nasal lesion

## 2017-07-12 NOTE — NURSING NOTE
Chief Complaint   Patient presents with     RECHECK     Return Rhinology 6-8 wks F/U      Pt states no pain today.    N Nitesh VILA

## 2017-07-12 NOTE — PATIENT INSTRUCTIONS
Plan of care:  Use Mupirocin/Bactroban ointment as instructed  Try Law Simply Saline, daily  Clinic contact information:  1. To schedule an appointment call [phone], option 1  2. To talk to the Triage RN call [phone], option 3  3. If you need to speak to Hailey or get a message to your doctor on a Friday, call the triage RN  4. HaileyRN: [phone]  5. Surgery scheduling:      Leonila Calhoun: [phone]      Orly Montelongo: [phone]  6. Fax: [phone]  7. [phone]

## 2018-04-26 ENCOUNTER — OFFICE VISIT (OUTPATIENT)
Dept: OTOLARYNGOLOGY | Facility: CLINIC | Age: 38
End: 2018-04-26
Payer: COMMERCIAL

## 2018-04-26 DIAGNOSIS — R49.0 DYSPHONIA: Primary | ICD-10-CM

## 2018-04-26 DIAGNOSIS — J38.3 LESION OF VOCAL CORD: ICD-10-CM

## 2018-04-26 NOTE — MR AVS SNAPSHOT
After Visit Summary   4/26/2018    Jhon Gu    MRN: [id number]           Patient Information     Date Of Birth          1980        Visit Information        Provider Department      4/26/2018 3:00 PM Naz Claire, SLP [facility]        Today's Diagnoses     Dysphonia    -  1    Lesion of vocal cord           Follow-ups after your visit        Who to contact     Please call your clinic at [phone] to:    Ask questions about your health    Make or cancel appointments    Discuss your medicines    Learn about your test results    Speak to your doctor            Additional Information About Your Visit        MyChart Information     [employer] gives you secure access to your electronic health record. If you see a primary care provider, you can also send messages to your care team and make appointments. If you have questions, please call your primary care clinic.  If you do not have a primary care provider, please call [phone] and they will assist you.      [employer] is an electronic gateway that provides easy, online access to your medical records. With [employer], you can request a clinic appointment, read your test results, renew a prescription or communicate with your care team.     To access your existing account, please contact your [facility] or call [phone] for assistance.        Care EveryWhere ID     This is your Care EveryWhere ID. This could be used by other organizations to access your [place] medical records  [id number]         Blood Pressure from Last 3 Encounters:   05/01/17 125/76   05/02/14 133/76    Weight from Last 3 Encounters:   05/01/17 114.8 kg (253 lb)   03/06/17 115.7 kg (255 lb)   06/05/14 114.3 kg (252 lb)              We Performed the Following     C BEHAVIORAL & QUALITATIVE ANALYSIS VOICE AND RESONANCE     IMAGESTREAM RECORDING ORDER     LARYNGOSCOPY FLEX/RIGID W STROBOSCOPY     SPEECH/HEARING THERAPY, INDIVIDUAL         Primary Care Provider Fax #    Leigha Jackman [phone]       [facility] [address] [place] [address]        Equal Access to Services     DARRIAN CARBAJAL : Hadii aad ku hadcarolineramana Mariamjean-paulali, sudarshanda trinykathyha, janiceta kajorge luisda jeramy, monica jomalka sheela. So [facility] [phone].    ATENCIÓN: Si habla español, tiene a booth disposición servicios gratuitos de asistencia lingüística. Llame al [phone].    We comply with applicable federal civil rights laws and [place] laws. We do not discriminate on the basis of race, color, national origin, age, disability, sex, sexual orientation, or gender identity.            Thank you!     Thank you for choosing [facility]  for your care. Our goal is always to provide you with excellent care. Hearing back from our patients is one way we can continue to improve our services. Please take a few minutes to complete the written survey that you may receive in the mail after your visit with us. Thank you!             Your Updated Medication List - Protect others around you: Learn how to safely use, store and throw away your medicines at www.disposemymeds.org.          This list is accurate as of 4/26/18 11:59 PM.  Always use your most recent med list.                   Brand Name Dispense Instructions for use Diagnosis    ADVIL 200 MG tablet   Generic drug:  ibuprofen      Take 200 mg by mouth every 4 hours as needed.        CLARITIN 10 MG tablet   Generic drug:  loratadine      Take 10 mg by mouth daily        fluticasone 50 MCG/ACT spray    FLONASE     Spray 2 sprays into both nostrils daily        MULTIVITAMIN ADULT PO

## 2018-04-26 NOTE — LETTER
"4/26/2018       RE: Jhon Gu  [address]    [place] [address]     Dear Colleague,    Thank you for referring your patient, Jhon Gu, to the [facility]. Please see a copy of my visit note below.    [facility]  Negro Camacho Jr., M.D., F.A.C.S.  Calli Steward M.D., M.P.H.  Kira Mayers, Ph.D., CCC/SLP  Naz Claire M.M. (voice), M.A., CCC/SLP  Kilo Maynadr M.M. (voice), M.A., CCC/SLP    Evaluation report    Clinician: Naz Claire M.M. (voice), M.A., CCC/SLP  Referring physician:  Self  Patient: Jhon Gu  Date of Visit: 4/26/2018    HISTORY  Chief complaint: Jhon \"Bill\" Brittanie is a 38 year old presenting today for evaluation of dysphonia.   CURRENT SYMPTOMS INCLUDE  VOICE    Professional [employer] in [employer] [place] [employer]    Sinus issues before Easter and had trouble getting voice out.    Has been regularly using a Neilmed sinus rinse, mucinex, drinks water, humidifier.    Finally got some antibiotics a bout 10 days ago.  Feels better before showering, and then showering \"loosens\" up the mucus.    voice is unpredictable; cancelled a recent audition    Neck pain for a while, left side and wasn't sure if related to yoga    Singing can end up with a tired and tender voice    Talking is ok AM and now buzzy and goopy and dry.    Feels very dry.    [employer] concert this weekend, and he has a solo    COUGH/THROAT CLEARING    Little coughing tries not to clear his thorat, but drinks water instead     SWALLOWING    Denies issues    RESPIRATION    Denies issues     OTHER PERTINENT HISTORY    Allergies; managed with claritin    Staying at house with dogs for relatives and is allergic to dogs.    Past Medical History:   Diagnosis Date     Chronic sinusitis 11/16     No past surgical history on file.    OBJECTIVE  PATIENT REPORTED MEASURES    Effort to talk: 2 / 10 (0-10 in which 10 " "represents maximal effort)    Effort to sin / 10 (0-10 in which 10 represents maximal effort)    Voice quality: 3 / 10 (0-10 in which 10 represents best possible voice)  Patient Supplied Answers To VHI Questionnaire  Voice Handicap Index (VHI-10) 2018   My voice makes it difficult for people to hear me 1   People have difficulty understanding me in a noisy room 1   My voice difficulties restrict my personal and social life.  1   I feel left out of conversations because of my voice 1   My voice problem causes me to lose income 1   I feel as though I have to strain to produce voice 1   The clarity of my voice is unpredictable 2   My voice problem upsets me 3   My voice makes me feel handicapped 2   People ask, \"What's wrong with your voice?\" 1   VHI-10 14       Patient Supplied Answers To CSI Questionnaire  No flowsheet data found.    Patient Supplied Answers To EAT Questionnaire  No flowsheet data found.      PERCEPTUAL EVALUATION (CPT 94285)  POSTURE / TENSION:     upper body    neck and shoulders    BREATHING:     appears within normal limits and adequate     LARYNGEAL PALPATION:     firm musculature    tenderness of the thyrohyoid area    reduced thyrohyoid space    VOICE:    Roughness: Mild Intermittent    Breathiness: WNL    Strain: WNL    Loudness    Conversational speech:  WNL    Projected speech:  WNL    Pitch:    Conversational speech:  Mildly elevated    Pitch glide: neurologically normal/ connected    Resonance:    Conversational speech:  backward focus of resonance    Singing vs. Speech:  Singing is improved    CAPE-V Overall Severity:  25/100    COUGH/THROAT CLEARING:    Not observed    LARYNGEAL EXAMINATION  Procedure: Flexible endoscopy with chip-tip technology with stroboscopy, left nostril; topical anesthesia with 3% Lidocaine and 0.25% phenylephrine was applied.   Performed by: Naz Claire M.M. (voice), M.AAlejandro, CCC/SLP   The laryngeal and pharyngeal structures were evaluated for gross " appearance, mobility, function, and focal lesions / abnormalities of the associated mucosa.  Stroboscopy was warranted to evaluate closure, symmetry, and vibratory characteristics of the vocal folds.  All findings were within normal limits with the exception of the following salient features:     Relatively healthy larynx    Mild to moderate AP constriction during running speech    Mildly incomplete due to subtle swellings along the vibratory margins at mid-fold    THERAPY PROBES: Improvement was elicited with use of forward resonant stimuli, coordination of respiration and phonation and use of yawn sigh    The addition of stroboscopy allowed evaluation of the mucosal wave:    Amplitude: right: WNL; left: WNL     Symmetry: good symmetry.    Closure pattern: subtle hourglass    Closure plane: at glottic level.     Phase distribution: normal.    Subtle leukoplakic/ fibrotic changes present along the vibratory margins at midfold.  He noted that these have been observed in the past.       Close up of subtle hourglass and changes along the vibratory marigns    The laryngeal exam was reviewed with Mr. Gu, and I provided pertinent explanations, as well as written and oral information.    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 Dysphonia/discomfort is compounded by the hyperfunction and imbalanced function of the intrinsic and extrinsic laryngeal musculature  .    STIMULABILITY: results of therapy probes during perceptual and laryngeal evaluation demonstrate improvement with use of forward resonant stimuli, coordination of respiration and phonation and use of yawn sigh    RECOMMENDATIONS:     A course of speech therapy is recommended to optimize vocal technique, improve voice quality, promote reduced discomfort, effort and fatigue, promote reduction of vocal fold  "impact to help resolve the lesions and help reduce chronic cough, throat clear and mucosal irritation.    He demonstrates a Good prognosis for improvement given adherence to therapeutic recommendations.     Positive indicators: positive response to therapy probes diagnosis is known to respond to treatment    Negative indicators: none    DURATION / FREQUENCY: 3 one-hour bi-weekly sessions. A total of 6-8 sessions may be necessary.    GOALS:  Patient goal:   1. To improve and maintain a healthy voice quality  2. To resolve the vocal fold lesions  3. To understand the problem and fix it as much as possible    Long-term goal(s): In 6 months, Mr. Gu will:  Report a week of typical vocal activities, in which dysphonia and throat discomfort/ fatigue do not exceed a level of 2 out of 10, 80% of the time   Report a speaking and singing voice quality that is acceptable to him, 90%of the time  Report resolution of dysphonia, and use of optimal voice quality to meet personal, social, and professional needs, 90% of the time during a typical week of vocal activities    This treatment plan was developed with the patient who agreed with the recommendations.  _______________________________________________________________________  THERAPY NOTE (CPT 25825)  Date of Service: 4/26/2018    THERAPEUTIC ACTIVITIES  Counseling and Education    Asked many questions about the nature of his symptoms, and I answered all of these thoroughly.    Semi-Occluded Vocal Tract (SOVT) exercises instructed to reduce laryngeal tension, promote vocal fold pliability, and coordinate respiration and phonation    Straw phonation with water resistance was found to be most facilitating     Sustained phonation, and voice vs. voiceless productions used to promote easy voicing and raise awareness of laryngeal tension    Ascending and descending glides utilized to promote vocal fold pliability    \"Messa di voce\", gradual crescendo and decrescendo to vary " "medial compression was also utilized to promote vocal fold pliability.    Instructed on the benefits of using these exercises for improved coordination of breath flow with phonation and tissue mobilization.    Instructed on the importance of using these exercises as a warm-up / cool down,  and to re-calibrate the voice throughout the day.    Resonant Voice Therapy (RVT) exercises to promote forward locus of resonance and optimized pattern of laryngeal adduction    Speech material that elicits a high, forward tongue position (/n/) was most facilitating    Easy descending glide on /n/ utilized in conjunction with relaxed jaw, tongue, and lightly closed lips to facilitate forward resonant sound    Use of the carrier phrase \"nnhnn\" instructed to promote generalization to everyday speech    Syllable level using /n/ in alternation with cardinal vowels on sustained pitches and speech inflection    Word level exercises featuring nasal continuant loaded stimuli    Phrase level exercises featuring nasal continuants in more complex phonemic contexts were employed    Instructed with and interval of a descending 5th, as well as an arpeggio pattern was facilitating, prior to progressing to comfortable speech using optimal breath flow.      Concepts of an optimal regimen for practice were instructed.  o He should use an interval schedule of practice, with brief periods of practice frequently throughout each day  o Felicity concepts of volitional practice to facilitate motor learning.    I provided handouts of today's therapeutic activities to facilitate practice.    ASSESSMENT/PLAN  PROGRESS TOWARD LONG TERM GOALS:   Minimal at this point, as this is first session, but good learning today    IMPRESSIONS: R49.0 (Dysphonia) and  J38.3 (Lesion Of The Vocal Fold).     PLAN: I will see Mr. Gu in 2-3 weeks to begin a brief course of therapy.     TOTAL SERVICE TIME: 60 minutes  EVALUATION OF VOICE AND RESONANCE: (39329): 30 minutes  " "  TREATMENT (56916): 15 minutes  ENDOSCOPIC LARYNGEAL EXAMINATION WITH STROBOSCOPY (43284): 30 minutes  NO CHARGE FACILITY FEE (12840)    Naz Claire M.M. (voice), M.A., CCC/SLP  Speech-Language Pathologist  Certificate of Vocology  [facility]  [phone]  [email or web address]  Prounouns: she/her      SOVT lip trill or bubbles, n+ vowels and phrases, 3-5-1, and yawn sighs.  2 - 3 sessions working on the singing voice.     Ent Slp Intake      Question    4/26/2018  2:47 PM CDT     Are you having any of these symptoms?  Throat irritation       Throat tightness       Pain/ache in throat       Mucus in throat       Poor voice quality     Are you having any of these symptoms?  Voice tires easily       Voice breaks       Change in vocal volume       Shaky/unsteady voice       Raspy voice     Do you use caffeine?  Yes     If you do use caffeine, how many oz. do you typically drink in a day?  1     How many oz. of non-caffeinated fluid do you typically drink in a day?  120     How often do you experience heartburn, indigestion, chest pain, stomach acid coming up, and/or tasting acid in your mouth or throat?  Weekly     If you marked \"Other\", please comment:       Have reflux medications been recommended to you?  No     If yes, are you taking them regularly?  N/A     Would you like to be evaluated for the following problems at this visit?  In the next section(s), we will ask you tell us more about each of the problem you select.       Voice:  Yes     Swallowing:  No     Cough and/or throat-clearing:  No     Breathing problem:  No     Throat discomfort and/or the feeling of a lump in your throat:  No     A different problem, not listed above:  No     Other physicians/health care providers who should receive a copy of today's note (please provide first and last name(s), city):       If anyone is helping you complete this form (such as an , clinic staff, caregiver, family member, etc.) please " identify them here.       How long have you had this voice problem?  3 weeks     In regards to your voice problem, was there anything unusual about the time the problem was first noticed (such as illness, accident, surgery, etc.)?  If yes, please describe.  You have 200 characters to respond.  We will ask for more detail at your visit.  sinusinfection     What do you think caused this voice problem?       How quickly did the voice problem develop?  Gradually     For your voice problem, how do the symptoms vary?  Worse after heavy voice use       Variable     Over time, how has the voice problem changed?  Worse     How would you rate your typical vocal demand?  Extraordinary: Very high voice demands; your work or personal success depends heavily on your voice quality     Please list activities that involve your voice (such as singing, coaching, etc.):  singing speaki ng     During the last 12 months, how much of a problem did you have with your voice?  No problem     On a scale of 0-10, please rate the following.       Effort for speaking  2     Effort for singing  4     Overall vocal quality  3     Please indicate how you feel about your voice problem.       There isn't much I can do to help myself feel better about this problem.  Agree somewhat     How I deal with the voice problem now is under my control.  Agree somewhat     I don't have much control over my emotional reactions to this problem.  Agree somewhat     When I am upset about the voice problem, I can find a way to feel better.  Agree somewhat     I have control over my day-to-day reactions to the voice problem.  Agree somewhat     There isn't much I can do to keep the voice problem from affecting me.  Agree somewhat     I have control over how I think about the voice problem.  Agree somewhat     My reaction to the voice problem is not under my control.  Agree somewhat     Is your voice ever normal, even briefly?  Yes     What health care professionals  have you seen for this voice problem?  Who and when?       For your voice problem, what testing/studies have you done (such as imaging/reflux testing)?       Did you receive any therapy or treatment for your voice problem?  Please describe briefly.       Prior to this episode, have you ever had a voice problem before?  If yes, at that time did you have therapy or treatment for the voice problem?  Please provide a brief description of that treatment.       For your voice problem, is there anything else you'd like to tell us?       VPCMEAN (range: 1 - 32)  2

## 2018-12-03 ENCOUNTER — TRANSFERRED RECORDS (OUTPATIENT)
Dept: HEALTH INFORMATION MANAGEMENT | Facility: CLINIC | Age: 38
End: 2018-12-03

## 2018-12-03 ENCOUNTER — MEDICAL CORRESPONDENCE (OUTPATIENT)
Dept: HEALTH INFORMATION MANAGEMENT | Facility: CLINIC | Age: 38
End: 2018-12-03

## 2019-01-14 ENCOUNTER — PRE VISIT (OUTPATIENT)
Dept: ORTHOPEDICS | Facility: CLINIC | Age: 39
End: 2019-01-14

## 2019-01-14 NOTE — TELEPHONE ENCOUNTER
RECORDS RECEIVED FROM: History of Left ankle surgery at [facility] (August 2016)- requesting consult for hardware removal- records available from [facility]- referred by PCP at [facility]- appt per pt   DATE RECEIVED: 1/14/19   NOTES STATUS DETAILS   OFFICE NOTE from referring provider Received 12/3/18 [place]   OFFICE NOTE from other specialist  Records requested from [facility]   DISCHARGE SUMMARY from hospital     DISCHARGE REPORT from the ER     OPERATIVE REPORT requested 8/2016 at [facility]   MEDICATION LIST     IMPLANT RECORD/STICKER     LABS     CBC/DIFF N/A    CULTURES N/A    INJECTIONS DONE IN RADIOLOGY     MRI     CT SCAN     XRAYS (IMAGES & REPORTS)     TUMOR     PATHOLOGY  Slides & report N/A

## 2019-01-15 ENCOUNTER — DOCUMENTATION ONLY (OUTPATIENT)
Dept: ORTHOPEDICS | Facility: CLINIC | Age: 39
End: 2019-01-15

## 2019-01-15 ENCOUNTER — OFFICE VISIT (OUTPATIENT)
Dept: ORTHOPEDICS | Facility: CLINIC | Age: 39
End: 2019-01-15
Payer: COMMERCIAL

## 2019-01-15 ENCOUNTER — ANCILLARY PROCEDURE (OUTPATIENT)
Dept: GENERAL RADIOLOGY | Facility: CLINIC | Age: 39
End: 2019-01-15
Payer: COMMERCIAL

## 2019-01-15 VITALS — HEIGHT: 72 IN | WEIGHT: 255.6 LBS | BODY MASS INDEX: 34.62 KG/M2

## 2019-01-15 DIAGNOSIS — M79.672 LEFT FOOT PAIN: ICD-10-CM

## 2019-01-15 DIAGNOSIS — M79.672 LEFT FOOT PAIN: Primary | ICD-10-CM

## 2019-01-15 DIAGNOSIS — M25.572 PAIN IN JOINT, ANKLE AND FOOT, LEFT: Primary | ICD-10-CM

## 2019-01-15 RX ORDER — ESCITALOPRAM OXALATE 10 MG/1
TABLET ORAL
Refills: 0 | COMMUNITY
Start: 2019-01-04

## 2019-01-15 ASSESSMENT — MIFFLIN-ST. JEOR: SCORE: 2123.14

## 2019-01-15 NOTE — LETTER
1/15/2019       RE: Jhon Gu  [address]  [address]  [facility] [address]     Dear Colleague,    Thank you for referring your patient, Jhon Gu, to the [facility]. Please see a copy of my visit note below.    CHIEF COMPLAINT:     1.  Status post left ankle open reduction and internal fixation.   2.  Painful retained hardware, left ankle.      HISTORY OF PRESENT ILLNESS:  Mr. Gu is a 38-year-old male who presents today for evaluation of his left ankle.  The patient reports to have had a fracture approximately a year and half ago and to have had open reduction, internal fixation by Bk Navarro.  This was done without any complication and he is very pleased with the treatment and results from his ankle joint.  Now, however, he reports to have some pain and discomfort with palpation as well as wearing boots or even getting into some positions while doing yoga, something that he is very much interested in.      The patient has an interest in undergoing hardware removal of the ankle in order to improve his level of function.  The patient reports now to seek medical treatment here given the fact that now he lives closer to us then to Dr. Navarro.      He reports to work as a  and to enjoy a variety of outdoors activities including yoga.      PAST MEDICAL HISTORY:  None.      PAST SURGICAL HISTORY:  Reviewed today.      DRUG ALLERGIES:  Penicillin.      MEDICATIONS:  Please refer to encounter form.      PHYSICAL EXAMINATION:  On today's visit, he presents as a pleasant male in no apparent distress with a height of 6 feet and a weight of 255 pounds.  Denies to have any constitutional symptoms.      On today's visit, he presents with full range of motion of the left ankle, hindfoot and midfoot joints.  CMS intact.  Skin intact.  Presents with palpable hardware across the lateral malleolus.  There are no signs of  infection or inflammation.      RADIOGRAPHIC EVALUATION:  Three views of the ankle were reviewed today which were significant for showing hardware across the lateral malleolus without any presence of any radiolucency or fracture line.  I cannot identify the brand of the hardware.      ASSESSMENT:  Status post left ankle open reduction, internal fixation with painful retained hardware.      PLAN:  I discussed with the patient that it would be very reasonable to proceed with hardware removal at the best of his convenience.  I discussed with him the most likely postoperative course and complications which include but are not limited to infection, bleeding, nerve damage, residual pain and stiffness.      All questions were answered.  The patient was pleased with the discussion.  The patient will schedule surgery at the best of his convenience, which will consist of left ankle lateral malleolus hardware removal.      TT 30 minutes, CT 20 minutes.       Willy Velasco MD

## 2019-01-15 NOTE — PROGRESS NOTES
Patient is scheduled for surgery with Dr. Velasco    Spoke or left message with: Patient    Date of Surgery: 2/13/19    Location: [facility]    Post ops: 2 weeks & 6 weeks    Pre-op with surgeon (if applicable): Complete    H&P: Patient to schedule with PCP    Additional imaging/appointments: N/A    Surgery packet: Received in clinic    Additional comments: N/A

## 2019-01-15 NOTE — NURSING NOTE
Teaching Flowsheet   Relevant Diagnosis: left ankle hardware removal  Teaching Topic: preop     Person(s) involved in teaching:   Patient     Motivation Level:  Asks Questions: Yes  Eager to Learn: Yes  Cooperative: Yes  Receptive (willing/able to accept information): Yes  Any cultural factors/Latter-day beliefs that may influence understanding or compliance? No       Patient demonstrates understanding of the following:  Reason for the appointment, diagnosis and treatment plan: Yes  Knowledge of proper use of medications and conditions for which they are ordered (with special attention to potential side effects or drug interactions): Yes  Which situations necessitate calling provider and whom to contact: Yes    Teaching Concerns Addressed:        Proper use and care of soap (medical equip, care aids, etc.): Yes  Nutritional needs and diet plan: Yes  Pain management techniques: Yes  Wound Care: Yes  How and/when to access community resources: Yes     Instructional Materials Used/Given: surgery packet reviewed with patient.  He will obtain H&P with PCP.  He ha no other questions at this time.

## 2019-01-15 NOTE — PROGRESS NOTES
CHIEF COMPLAINT:     1.  Status post left ankle open reduction and internal fixation.   2.  Painful retained hardware, left ankle.      HISTORY OF PRESENT ILLNESS:  Mr. Gu is a 38-year-old male who presents today for evaluation of his left ankle.  The patient reports to have had a fracture approximately a year and half ago and to have had open reduction, internal fixation by Bk Navarro.  This was done without any complication and he is very pleased with the treatment and results from his ankle joint.  Now, however, he reports to have some pain and discomfort with palpation as well as wearing boots or even getting into some positions while doing yoga, something that he is very much interested in.      The patient has an interest in undergoing hardware removal of the ankle in order to improve his level of function.  The patient reports now to seek medical treatment here given the fact that now he lives closer to us then to Dr. Navarro.      He reports to work as a  and to enjoy a variety of outdoors activities including yoga.      PAST MEDICAL HISTORY:  None.      PAST SURGICAL HISTORY:  Reviewed today.      DRUG ALLERGIES:  Penicillin.      MEDICATIONS:  Please refer to encounter form.      PHYSICAL EXAMINATION:  On today's visit, he presents as a pleasant male in no apparent distress with a height of 6 feet and a weight of 255 pounds.  Denies to have any constitutional symptoms.      On today's visit, he presents with full range of motion of the left ankle, hindfoot and midfoot joints.  CMS intact.  Skin intact.  Presents with palpable hardware across the lateral malleolus.  There are no signs of infection or inflammation.      RADIOGRAPHIC EVALUATION:  Three views of the ankle were reviewed today which were significant for showing hardware across the lateral malleolus without any presence of any radiolucency or fracture line.  I cannot identify the brand of the hardware.      ASSESSMENT:  Status post  left ankle open reduction, internal fixation with painful retained hardware.      PLAN:  I discussed with the patient that it would be very reasonable to proceed with hardware removal at the best of his convenience.  I discussed with him the most likely postoperative course and complications which include but are not limited to infection, bleeding, nerve damage, residual pain and stiffness.      All questions were answered.  The patient was pleased with the discussion.  The patient will schedule surgery at the best of his convenience, which will consist of left ankle lateral malleolus hardware removal.      TT 30 minutes, CT 20 minutes.

## 2019-01-15 NOTE — NURSING NOTE
"Reason For Visit:   Chief Complaint   Patient presents with     Consult     Left ankle pain        Ht 1.83 m (6' 0.05\")   Wt 115.9 kg (255 lb 9.6 oz)   BMI 34.62 kg/m      Pain Assessment  Patient Currently in Pain: Yes  0-10 Pain Scale: 1  Primary Pain Location: Ankle  Pain Descriptors: Sore  Alleviating Factors: Rest  Aggravating Factors: (boots, sitting cross legged)    Keyonna Khan ATC    "

## 2019-01-18 ENCOUNTER — TELEPHONE (OUTPATIENT)
Dept: ORTHOPEDICS | Facility: CLINIC | Age: 39
End: 2019-01-18

## 2019-01-18 NOTE — TELEPHONE ENCOUNTER
[facility] Call Center    Phone Message    May a detailed message be left on voicemail: yes    Reason for Call: Other: pt calling about his upcoming surgery and wants to know if it is medically neccessary to have his his plates and screws out. If it will be coded medically necessary     Action Taken: Message routed to:  [facility] orthopedics

## 2019-01-18 NOTE — TELEPHONE ENCOUNTER
Patient was called back regarding his message about his upcoming surgery.  A message was left with the information that from Dr. Velasco's note he recommended the surgery based on the patient's symptoms that he was experiencing.  If he needs further information he can call us back.  We can contact our medical coders to get the specific code for his surgery.

## 2019-02-10 ENCOUNTER — ANESTHESIA EVENT (OUTPATIENT)
Dept: SURGERY | Facility: AMBULATORY SURGERY CENTER | Age: 39
End: 2019-02-10

## 2019-02-12 NOTE — ANESTHESIA PREPROCEDURE EVALUATION
"Anesthesia Pre-Procedure Evaluation    Patient: Jhon Gu   MRN:     [id number] Gender:   male   Age:    38 year old :      1980        Preoperative Diagnosis: Painful Hardware, Left Ankle   Procedure(s):  Left Ankle Hardware Removal     Past Medical History:   Diagnosis Date     Chronic sinusitis       No past surgical history on file.       Anesthesia Evaluation     . Pt has had prior anesthetic. Type: General           ROS/MED HX    ENT/Pulmonary:  - neg pulmonary ROS     Neurologic:  - neg neurologic ROS     Cardiovascular:  - neg cardiovascular ROS       METS/Exercise Tolerance:     Hematologic:  - neg hematologic  ROS       Musculoskeletal: Comment: Painful Hardware, Left Ankle - neg musculoskeletal ROS       GI/Hepatic:  - neg GI/hepatic ROS       Renal/Genitourinary:  - ROS Renal section negative       Endo:  - neg endo ROS       Psychiatric:  - neg psychiatric ROS       Infectious Disease:  - neg infectious disease ROS       Malignancy:      - no malignancy   Other:    - neg other ROS                 [facility] AN PHYSICAL EXAM    Lab Results   Component Value Date    HGB 12.8 (L) 2014    HCT 38.7 (L) 2014     2014    INR 0.98 2014       Preop Vitals  BP Readings from Last 3 Encounters:   17 125/76   14 133/76    Pulse Readings from Last 3 Encounters:   17 90   14 67      Resp Readings from Last 3 Encounters:   17 16   14 16    SpO2 Readings from Last 3 Encounters:   17 97%   14 97%      Temp Readings from Last 1 Encounters:   14 36.9  C (98.4  F)    Ht Readings from Last 1 Encounters:   01/15/19 1.83 m (6' 0.05\")      Wt Readings from Last 1 Encounters:   01/15/19 115.9 kg (255 lb 9.6 oz)    Estimated body mass index is 34.62 kg/m  as calculated from the following:    Height as of 1/15/19: 1.83 m (6' 0.05\").    Weight as of 1/15/19: 115.9 kg (255 lb 9.6 oz).     LDA:            Assessment:   ASA SCORE: 2  "   NPO Status: > 2 hours since completed Clear Liquids; > 6 hours since completed Solid Foods   Documentation: H&P complete; Preop Testing complete; Consents complete   Proceeding: Proceed without further delay  Tobacco Use:  NO Active use of Tobacco/UNKNOWN Tobacco use status     Plan:   Anes. Type:  General   Pre-Induction: Midazolam IV; Acetaminophen PO   Induction:  IV (Standard); Propofol   Airway: LMA   Access/Monitoring: PIV   Maintenance: Balanced   Emergence: Procedure Site   Logistics: Same Day Surgery     Postop Pain/Sedation Strategy:  Standard-Options: Opioids PRN; IV Ketorolac; LA by Surgeon     PONV Management:  Adult Risk Factors:, Non-Smoker, Postop Opioids  Prevention: Ondansetron; Dexamethasone     CONSENT: Direct conversation   Plan and risks discussed with: Patient   Blood Products: N/a       Comments for Plan/Consent:  37 yo forLeft Ankle Hardware Removal (Left Ankle) under GA/LMA    No block per Dr. Velasco.                     ANESTHESIA PREOP EVALUATION    PROCEDURE: Procedure(s):  Left Ankle Hardware Removal    HPI: Jhon Gu is a 38 year old male who presents for above procedure 2/2 painful hardware.    PAST MEDICAL HISTORY:    Past Medical History:   Diagnosis Date     Chronic sinusitis 11/16       PAST SURGICAL HISTORY:    No past surgical history on file.    PAST ANESTHESIA HISTORY:     No personal or family h/o anesthesia problems    SOCIAL HISTORY:       Social History     Tobacco Use     Smoking status: Never Smoker     Smokeless tobacco: Never Used   Substance Use Topics     Alcohol use: Yes     Comment: 1-2 drinks twice weekly       ALLERGIES:     Allergies   Allergen Reactions     Latex      Nkda [No Known Drug Allergies]      Penicillins Rash       MEDICATIONS:       (Not in a hospital admission)    Current Outpatient Medications   Medication Sig Dispense Refill     escitalopram (LEXAPRO) 10 MG tablet   0     fluticasone (FLONASE) 50 MCG/ACT nasal spray Spray 2 sprays into  both nostrils daily       ibuprofen (ADVIL) 200 MG tablet Take 200 mg by mouth every 4 hours as needed.       loratadine (CLARITIN) 10 MG tablet Take 10 mg by mouth daily       Multiple Vitamins-Minerals (MULTIVITAMIN ADULT PO)          Current Outpatient Medications Ordered in Epic   Medication Sig Dispense Refill     escitalopram (LEXAPRO) 10 MG tablet   0     fluticasone (FLONASE) 50 MCG/ACT nasal spray Spray 2 sprays into both nostrils daily       ibuprofen (ADVIL) 200 MG tablet Take 200 mg by mouth every 4 hours as needed.       loratadine (CLARITIN) 10 MG tablet Take 10 mg by mouth daily       Multiple Vitamins-Minerals (MULTIVITAMIN ADULT PO)        No current Epic-ordered facility-administered medications on file.        PHYSICAL EXAM:    Vitals: T Data Unavailable, P Data Unavailable, BP Data Unavailable, R Data Unavailable, SpO2  , Weight Wt Readings from Last 2 Encounters:   01/15/19 115.9 kg (255 lb 9.6 oz)   05/01/17 114.8 kg (253 lb)     See doc flowsheet    NPO STATUS: see doc flowsheet    LABS:    BMP:  No results for input(s): NA, POTASSIUM, CHLORIDE, CO2, BUN, CR, GLC, RUDY in the last 81271 hours.    LFTs:   No results for input(s): PROTTOTAL, ALBUMIN, BILITOTAL, ALKPHOS, AST, ALT, BILIDIRECT in the last 73936 hours.    CBC:   Recent Labs   Lab Test 05/02/14  0715   HGB 12.8*   HCT 38.7*          Coags:  Recent Labs   Lab Test 05/02/14  0715   INR 0.98       Imaging:  No orders to display       Ashkan Barbosa MD  Anesthesiology Staff  Pager [phone]    2/12/2019  11:39 AM        Ashkan Barbosa MD

## 2019-02-13 ENCOUNTER — ANESTHESIA (OUTPATIENT)
Dept: SURGERY | Facility: AMBULATORY SURGERY CENTER | Age: 39
End: 2019-02-13

## 2019-02-13 ENCOUNTER — HOSPITAL ENCOUNTER (OUTPATIENT)
Facility: AMBULATORY SURGERY CENTER | Age: 39
End: 2019-02-13
Attending: ORTHOPAEDIC SURGERY
Payer: COMMERCIAL

## 2019-02-13 VITALS
OXYGEN SATURATION: 98 % | RESPIRATION RATE: 16 BRPM | SYSTOLIC BLOOD PRESSURE: 143 MMHG | WEIGHT: 250 LBS | HEART RATE: 62 BPM | BODY MASS INDEX: 33.86 KG/M2 | HEIGHT: 72 IN | DIASTOLIC BLOOD PRESSURE: 87 MMHG | TEMPERATURE: 97.4 F

## 2019-02-13 DIAGNOSIS — M25.572 ACUTE LEFT ANKLE PAIN: Primary | ICD-10-CM

## 2019-02-13 RX ORDER — LIDOCAINE 40 MG/G
CREAM TOPICAL
Status: DISCONTINUED | OUTPATIENT
Start: 2019-02-13 | End: 2019-02-13 | Stop reason: HOSPADM

## 2019-02-13 RX ORDER — FENTANYL CITRATE 50 UG/ML
INJECTION, SOLUTION INTRAMUSCULAR; INTRAVENOUS PRN
Status: DISCONTINUED | OUTPATIENT
Start: 2019-02-13 | End: 2019-02-13

## 2019-02-13 RX ORDER — HYDROXYZINE HYDROCHLORIDE 25 MG/1
25 TABLET, FILM COATED ORAL 3 TIMES DAILY PRN
Qty: 30 TABLET | Refills: 0 | Status: SHIPPED | OUTPATIENT
Start: 2019-02-13

## 2019-02-13 RX ORDER — HYDROXYZINE HYDROCHLORIDE 25 MG/1
25 TABLET, FILM COATED ORAL
Status: DISCONTINUED | OUTPATIENT
Start: 2019-02-13 | End: 2019-02-14 | Stop reason: HOSPADM

## 2019-02-13 RX ORDER — ACETAMINOPHEN 325 MG/1
975 TABLET ORAL ONCE
Status: COMPLETED | OUTPATIENT
Start: 2019-02-13 | End: 2019-02-13

## 2019-02-13 RX ORDER — HYDROCODONE BITARTRATE AND ACETAMINOPHEN 5; 325 MG/1; MG/1
1 TABLET ORAL
Status: DISCONTINUED | OUTPATIENT
Start: 2019-02-13 | End: 2019-02-14 | Stop reason: HOSPADM

## 2019-02-13 RX ORDER — BUPIVACAINE HYDROCHLORIDE 5 MG/ML
INJECTION, SOLUTION PERINEURAL PRN
Status: DISCONTINUED | OUTPATIENT
Start: 2019-02-13 | End: 2019-02-13 | Stop reason: HOSPADM

## 2019-02-13 RX ORDER — NALOXONE HYDROCHLORIDE 0.4 MG/ML
.1-.4 INJECTION, SOLUTION INTRAMUSCULAR; INTRAVENOUS; SUBCUTANEOUS
Status: DISCONTINUED | OUTPATIENT
Start: 2019-02-13 | End: 2019-02-14 | Stop reason: HOSPADM

## 2019-02-13 RX ORDER — PROPOFOL 10 MG/ML
INJECTION, EMULSION INTRAVENOUS PRN
Status: DISCONTINUED | OUTPATIENT
Start: 2019-02-13 | End: 2019-02-13

## 2019-02-13 RX ORDER — HYDROMORPHONE HYDROCHLORIDE 1 MG/ML
.3-.5 INJECTION, SOLUTION INTRAMUSCULAR; INTRAVENOUS; SUBCUTANEOUS EVERY 10 MIN PRN
Status: DISCONTINUED | OUTPATIENT
Start: 2019-02-13 | End: 2019-02-14 | Stop reason: HOSPADM

## 2019-02-13 RX ORDER — ONDANSETRON 2 MG/ML
INJECTION INTRAMUSCULAR; INTRAVENOUS PRN
Status: DISCONTINUED | OUTPATIENT
Start: 2019-02-13 | End: 2019-02-13

## 2019-02-13 RX ORDER — KETOROLAC TROMETHAMINE 30 MG/ML
INJECTION, SOLUTION INTRAMUSCULAR; INTRAVENOUS PRN
Status: DISCONTINUED | OUTPATIENT
Start: 2019-02-13 | End: 2019-02-13

## 2019-02-13 RX ORDER — DEXAMETHASONE SODIUM PHOSPHATE 4 MG/ML
INJECTION, SOLUTION INTRA-ARTICULAR; INTRALESIONAL; INTRAMUSCULAR; INTRAVENOUS; SOFT TISSUE PRN
Status: DISCONTINUED | OUTPATIENT
Start: 2019-02-13 | End: 2019-02-13

## 2019-02-13 RX ORDER — GABAPENTIN 300 MG/1
300 CAPSULE ORAL ONCE
Status: COMPLETED | OUTPATIENT
Start: 2019-02-13 | End: 2019-02-13

## 2019-02-13 RX ORDER — ONDANSETRON 2 MG/ML
4 INJECTION INTRAMUSCULAR; INTRAVENOUS EVERY 30 MIN PRN
Status: DISCONTINUED | OUTPATIENT
Start: 2019-02-13 | End: 2019-02-14 | Stop reason: HOSPADM

## 2019-02-13 RX ORDER — OXYCODONE HYDROCHLORIDE 5 MG/1
5 TABLET ORAL EVERY 4 HOURS PRN
Status: DISCONTINUED | OUTPATIENT
Start: 2019-02-13 | End: 2019-02-14 | Stop reason: HOSPADM

## 2019-02-13 RX ORDER — LIDOCAINE HYDROCHLORIDE 20 MG/ML
INJECTION, SOLUTION INFILTRATION; PERINEURAL PRN
Status: DISCONTINUED | OUTPATIENT
Start: 2019-02-13 | End: 2019-02-13

## 2019-02-13 RX ORDER — HYDROCODONE BITARTRATE AND ACETAMINOPHEN 5; 325 MG/1; MG/1
1-2 TABLET ORAL EVERY 4 HOURS PRN
Qty: 20 TABLET | Refills: 0 | Status: SHIPPED | OUTPATIENT
Start: 2019-02-13 | End: 2019-02-16

## 2019-02-13 RX ORDER — CLINDAMYCIN PHOSPHATE 600 MG/50ML
600 INJECTION, SOLUTION INTRAVENOUS
Status: DISCONTINUED | OUTPATIENT
Start: 2019-02-13 | End: 2019-02-13 | Stop reason: HOSPADM

## 2019-02-13 RX ORDER — ONDANSETRON 4 MG/1
4 TABLET, ORALLY DISINTEGRATING ORAL EVERY 30 MIN PRN
Status: DISCONTINUED | OUTPATIENT
Start: 2019-02-13 | End: 2019-02-14 | Stop reason: HOSPADM

## 2019-02-13 RX ORDER — SODIUM CHLORIDE, SODIUM LACTATE, POTASSIUM CHLORIDE, CALCIUM CHLORIDE 600; 310; 30; 20 MG/100ML; MG/100ML; MG/100ML; MG/100ML
INJECTION, SOLUTION INTRAVENOUS CONTINUOUS
Status: DISCONTINUED | OUTPATIENT
Start: 2019-02-13 | End: 2019-02-14 | Stop reason: HOSPADM

## 2019-02-13 RX ORDER — SODIUM CHLORIDE, SODIUM LACTATE, POTASSIUM CHLORIDE, CALCIUM CHLORIDE 600; 310; 30; 20 MG/100ML; MG/100ML; MG/100ML; MG/100ML
INJECTION, SOLUTION INTRAVENOUS CONTINUOUS
Status: DISCONTINUED | OUTPATIENT
Start: 2019-02-13 | End: 2019-02-13 | Stop reason: HOSPADM

## 2019-02-13 RX ORDER — MEPERIDINE HYDROCHLORIDE 25 MG/ML
12.5 INJECTION INTRAMUSCULAR; INTRAVENOUS; SUBCUTANEOUS
Status: DISCONTINUED | OUTPATIENT
Start: 2019-02-13 | End: 2019-02-14 | Stop reason: HOSPADM

## 2019-02-13 RX ORDER — PROPOFOL 10 MG/ML
INJECTION, EMULSION INTRAVENOUS CONTINUOUS PRN
Status: DISCONTINUED | OUTPATIENT
Start: 2019-02-13 | End: 2019-02-13

## 2019-02-13 RX ORDER — CLINDAMYCIN PHOSPHATE 600 MG/50ML
600 INJECTION, SOLUTION INTRAVENOUS SEE ADMIN INSTRUCTIONS
Status: DISCONTINUED | OUTPATIENT
Start: 2019-02-13 | End: 2019-02-13 | Stop reason: HOSPADM

## 2019-02-13 RX ORDER — FENTANYL CITRATE 50 UG/ML
25-50 INJECTION, SOLUTION INTRAMUSCULAR; INTRAVENOUS
Status: DISCONTINUED | OUTPATIENT
Start: 2019-02-13 | End: 2019-02-14 | Stop reason: HOSPADM

## 2019-02-13 RX ADMIN — SODIUM CHLORIDE, SODIUM LACTATE, POTASSIUM CHLORIDE, CALCIUM CHLORIDE: 600; 310; 30; 20 INJECTION, SOLUTION INTRAVENOUS at 10:21

## 2019-02-13 RX ADMIN — LIDOCAINE HYDROCHLORIDE 100 MG: 20 INJECTION, SOLUTION INFILTRATION; PERINEURAL at 11:22

## 2019-02-13 RX ADMIN — DEXAMETHASONE SODIUM PHOSPHATE 4 MG: 4 INJECTION, SOLUTION INTRA-ARTICULAR; INTRALESIONAL; INTRAMUSCULAR; INTRAVENOUS; SOFT TISSUE at 11:35

## 2019-02-13 RX ADMIN — GABAPENTIN 300 MG: 300 CAPSULE ORAL at 10:20

## 2019-02-13 RX ADMIN — OXYCODONE HYDROCHLORIDE 5 MG: 5 TABLET ORAL at 12:21

## 2019-02-13 RX ADMIN — FENTANYL CITRATE 50 MCG: 50 INJECTION, SOLUTION INTRAMUSCULAR; INTRAVENOUS at 11:22

## 2019-02-13 RX ADMIN — PROPOFOL 300 MG: 10 INJECTION, EMULSION INTRAVENOUS at 11:22

## 2019-02-13 RX ADMIN — CLINDAMYCIN PHOSPHATE 900 MG: 600 INJECTION, SOLUTION INTRAVENOUS at 11:35

## 2019-02-13 RX ADMIN — FENTANYL CITRATE 50 MCG: 50 INJECTION, SOLUTION INTRAMUSCULAR; INTRAVENOUS at 11:34

## 2019-02-13 RX ADMIN — ACETAMINOPHEN 975 MG: 325 TABLET ORAL at 10:20

## 2019-02-13 RX ADMIN — ONDANSETRON 4 MG: 2 INJECTION INTRAMUSCULAR; INTRAVENOUS at 11:35

## 2019-02-13 RX ADMIN — PROPOFOL 250 MCG/KG/MIN: 10 INJECTION, EMULSION INTRAVENOUS at 11:22

## 2019-02-13 RX ADMIN — KETOROLAC TROMETHAMINE 30 MG: 30 INJECTION, SOLUTION INTRAMUSCULAR; INTRAVENOUS at 11:45

## 2019-02-13 ASSESSMENT — MIFFLIN-ST. JEOR: SCORE: 2096.99

## 2019-02-13 NOTE — BRIEF OP NOTE
[facility] Brief Operative Note    Patient: Jhon Gu  : 1980  Date of Service: 2019 12:01 PM      Pre-operative diagnosis: Painful Hardware, Left Ankle   Post-operative diagnosis Painful Hardware, Left Ankle   Procedure: Procedure(s):  Left Ankle Hardware Removal   Surgeon: Dr. Willy Velasco MD   Assistants(s): Jessica Sandoval PA-C   Estimated blood loss: 20 ml's    Specimens: None   Findings: None       Plan:  Same Day surgery discharge to home once criteria met.  CAM boot to remain on left  lower extremity and WBAT.  Norco and Vistaril for pain.  No dressing change on own.  Leave dressing on until 2 weeks follow up appointment.  F/U in clinic in 2 weeks    I was asked by Dr. Velasco to assist with surgery. I positioned and prepped the patient. I retracted soft tissue.   I suctioned fluids when needed. I provided traction for dissection. I helped to ligate blood vessels. I helped Dr. Velasco identify and protect important structures. The procedure was medically necessary for an assistant because Dr. Velasco needed the operative exposure and assistance that I provided. This allowed him to safely and efficiently operate. It was also important that I help ligate blood vessels to maintain hemostasis and reduce the bleeding risk. I helped with the closure of the operative incisions as well as helping with the boot/cast/splint.  The assistance that I provided reduced operative time which meant less general anesthetic for the patient. No qualified residents were available to assist.    Jessica Sandoval PA-C

## 2019-02-13 NOTE — OP NOTE
Procedure Date: 02/13/2019      PREOPERATIVE DIAGNOSIS:  Left ankle painful retained hardware.      POSTOPERATIVE DIAGNOSIS:  Left ankle painful retained hardware.      PROCEDURE:  Left ankle hardware removal.      SURGEON:  Willy Velasco MD      ASSISTANT:  ELIZABETH St Ms[employer]s help was required in order to help with positioning the patient, the surgery itself, holding retractors, closure of the wound and application of immobilization devices.  At time of surgery, there was no one available from an orthopedic trainee.       DRAINS:  None.      ESTIMATED BLOOD LOSS:  Less than 20 mL.      ANESTHESIA:  General endotracheal.      INDICATIONS:  Please refer to clinic notes for further details regarding indications for Mr. Gu's case.       PROCEDURE NOTE:  On 02/13/2019, the patient was taken to surgery.  Preoperative antibiotics were administered to the patient prior to arrival to the OR.      After successful induction of general endotracheal anesthesia, he was placed supine on the operating table.  The left lower extremity was prepped and draped in a sterile fashion.  After exsanguination by gravity, the tourniquet cuff was inflated to 300 mmHg on the proximal third of the left thigh.      The pause for the cause was performed according to our institution's policy, which confirmed laterality of the procedure.       Following this, we proceeded with incision of the lateral left ankle joint.  Subcutaneous tissues were dissected.  There was identification of the hardware, which consisted of a plate and 5 screws.  All screws were removed in one single piece as well as the plate.  Excessive scar tissue was also excised sharply with a #10 surgical blade, and an osteotome was passed along the lateral cortex to avoid any prominence of the bone as well.      The tourniquet cuff was deflated.  Satisfactory hemostasis was accomplished.  The wound was closed in layers.  Sterile dressings were  applied.  The patient was placed in a toe CAM Walker after application of 20 mL of Marcaine solution without epinephrine.      The patient was transferred in stable condition to PACU.      PLAN:  The patient will remain weightbearing as tolerated with the use of the CAM Walker at all times except for hygiene for the first 2 weeks from surgery.  At that time, sutures will be removed if indicated.  He will proceed with activities without restrictions.  It is not expected that the patient will require any physical therapy, but if that is his desire, that will be performed without any restrictions.  After the 2 week appointment, a CAM Walker will be utilized for comfort purposes.      The patient will be granted the possibility of skipping the 6 week appointment, assuming that the ankle is working up to his expectations.  In the eventuality of him returning to the 6 week appointment, no x-rays will be obtained.         SUSAN BALTAZAR MD             D: 2019   T: 2019   MT: levar      Name:     DEANGELO ALMAZAN   MRN:      [id number]        Account:        [id number]   :      1980           Procedure Date: 2019      Document: [id number]

## 2019-02-13 NOTE — DISCHARGE INSTRUCTIONS
[facility]  Home Care Following Anesthesia  For 24 hours after surgery:  1. Get plenty of rest.  A responsible adult must stay with you for at least 24 hours after you leave the surgery center.  2. Do not drive or use heavy equipment.  If you have weakness or tingling, don't drive or use heavy equipment until this feeling goes away.   3. Do not drink alcohol.   4. Avoid strenuous or risky activities.  Ask for help when climbing stairs.  5. You may feel lightheaded.  IF so, sit for a few minutes before standing.  Have someone help you get up.   6. If you have nausea (feel sick to your stomach): Drink only clear liquids such as apple juice, ginger ale, broth or 7-Up.  Rest may also help.  Be sure to drink enough fluids.  Move to a regular diet as you feel able.   7. You may have a slight fever.  Call the doctor if your fever is over 100 F (37.7 C) (taken under the tongue) or lasts longer than 24 hours.  8. You may have a dry mouth, a sore throat, muscle aches or trouble sleeping. These should go away after 24 hours.  9. Do not make important or legal decisions.               Tips for taking pain medications  To get the best pain relief possible, remember these points:    Take pain medications as directed, before pain becomes severe.    Pain medication can upset your stomach: taking it with food may help.    Constipation is a common side effect of pain medication. Drink plenty of  fluids.    Eat foods high in fiber. Take a stool softener if recommended by your doctor or pharmacist.    Do not drink alcohol, drive or operate machinery while taking pain medications.    Ask about other ways to control pain, such as with heat, ice or relaxation.    Tylenol/Acetaminophen Consumption  To help encourage the safe use of acetaminophen, the makers of TYLENOL  have lowered the maximum daily dose for single-ingredient Extra Strength TYLENOL  (acetaminophen) products sold in the [place] from 8  pills per day (4,000 mg) to 6 pills per day (3,000 mg). The dosing interval has also changed from 2 pills every 4-6 hours to 2 pills every 6 hours.    If you feel your pain relief is insufficient, you may take Tylenol/Acetaminophen in addition to your narcotic pain medication.     Be careful not to exceed 3,000 mg of Tylenol/Acetaminophen in a 24 hour period from all sources.    If you are taking extra strength Tylenol/acetaminophen (500 mg), the maximum dose is 6 tablets in 24 hours.    If you are taking regular strength acetaminophen (325 mg), the maximum dose is 9 tablets in 24 hours.    Call a doctor for any of the followin. Signs of infection (fever, growing tenderness at the surgery site, a large amount of drainage or bleeding, severe pain, foul-smelling drainage, redness, swelling).  2. It has been over 8 to 10 hours since surgery and you are still not able to urinate (pass water).  3. Headache for over 24 hours.  4. Numbness, tingling or weakness the day after surgery (if you had spinal anesthesia).  Your doctor is:       Dr. Willy Velasco, Orthopaedics: [phone]               Or dial [phone] and ask for the resident on call for:  Orthopaedics  For emergency care, call the:  [facility] Department: [phone] (TTY for hearing impaired: [phone])

## 2019-02-13 NOTE — ANESTHESIA CARE TRANSFER NOTE
Patient: Jhon Gu    Procedure(s):  Left Ankle Hardware Removal    Diagnosis: Painful Hardware, Left Ankle  Diagnosis Additional Information: No value filed.    Anesthesia Type:   No value filed.     Note:  Airway :Room Air          Vitals: (Last set prior to Anesthesia Care Transfer)    CRNA VITALS  2/13/2019 1134 - 2/13/2019 1210      2/13/2019             Pulse:  53    SpO2:  97 %    Resp Rate (observed):  10                Electronically Signed By: BRINDA Boateng CRNA  February 13, 2019  12:10 PM

## 2019-02-13 NOTE — ANESTHESIA POSTPROCEDURE EVALUATION
Anesthesia POST Procedure Evaluation    Patient: Jhon Gu   MRN:     [id number] Gender:   male   Age:    38 year old :      1980        Preoperative Diagnosis: Painful Hardware, Left Ankle   Procedure(s):  Left Ankle Hardware Removal   Postop Comments: No value filed.       Anesthesia Type:  General    Reportable Event: NO     PAIN: Uncomplicated   Sign Out status: Comfortable, Well controlled pain     PONV: No PONV   Sign Out status:  No Nausea or Vomiting     Neuro/Psych: Uneventful perioperative course   Sign Out Status: Preoperative baseline; Age appropriate mentation     Airway/Resp.: Uneventful perioperative course   Sign Out Status: Non labored breathing, age appropriate RR; Resp. Status within EXPECTED Parameters     CV: Uneventful perioperative course   Sign Out status: Appropriate BP and perfusion indices; Appropriate HR/Rhythm     Disposition:   Sign Out in:  Phase II  Disposition:  Home  Recovery Course: Uneventful  Follow-Up: Not required           Last Anesthesia Record Vitals:  CRNA VITALS  2019 1134 - 2019 1234      2019             Pulse:  53    SpO2:  97 %    Resp Rate (observed):  10          Last PACU/Preop Vitals:  Vitals:    19 1228 19 1232 19 1257   BP: 122/83 122/72 143/87   Pulse: 67 64 62   Resp: 15 16 16   Temp: 36.4  C (97.5  F)  36.3  C (97.4  F)   SpO2:  98%          Electronically Signed By: Ashkan Barbosa MD, 2019

## 2019-02-14 ENCOUNTER — OFFICE VISIT (OUTPATIENT)
Dept: ORTHOPEDICS | Facility: CLINIC | Age: 39
End: 2019-02-14
Payer: COMMERCIAL

## 2019-02-14 DIAGNOSIS — Z98.890 S/P FOOT SURGERY: Primary | ICD-10-CM

## 2019-02-14 NOTE — PROGRESS NOTES
John came in today to get his post op dressing changed as he bled through his original. I cut off the dressing and redressed the foot without complication. All questions were answered and I will see him in two weeks for his post op appointment.    Keyonna Khan, ATC

## 2019-02-27 ENCOUNTER — OFFICE VISIT (OUTPATIENT)
Dept: ORTHOPEDICS | Facility: CLINIC | Age: 39
End: 2019-02-27
Payer: COMMERCIAL

## 2019-02-27 DIAGNOSIS — Z98.890 S/P HARDWARE REMOVAL: Primary | ICD-10-CM

## 2019-02-27 NOTE — PROGRESS NOTES
Reason for visit:    Jhon Gu came in to the clinic for a two week post op check.    His surgery was done 2/13/19 by Dr Velasco.  He had Left Ankle Hardware Removal     Assessment:    Jhon came into the clinic in CAM walker with post op dressings WBAT    The Surgical wounds were exposed and found to be well-healed; so the sutures were removed.    Plan:     He was placed in a regular shoe.  He was told to remain WBAT. He has no restrictions and was told to ease into activity.      He has an appointment to see Dr. Velasco that he has the opportunity to skip this appointment if his foot is working to his standards.      He has our phone number and will call with questions or problems.      Keyonna Khan ATC    Answers for HPI/ROS submitted by the patient on 2/25/2019   General Symptoms: No  Skin Symptoms: No  HENT Symptoms: No  EYE SYMPTOMS: No  HEART SYMPTOMS: No  LUNG SYMPTOMS: No  INTESTINAL SYMPTOMS: No  URINARY SYMPTOMS: No  REPRODUCTIVE SYMPTOMS: No  SKELETAL SYMPTOMS: No  BLOOD SYMPTOMS: No  NERVOUS SYSTEM SYMPTOMS: No  MENTAL HEALTH SYMPTOMS: No

## 2019-03-26 ENCOUNTER — OFFICE VISIT (OUTPATIENT)
Dept: ORTHOPEDICS | Facility: CLINIC | Age: 39
End: 2019-03-26
Payer: COMMERCIAL

## 2019-03-26 DIAGNOSIS — M25.572 PAIN IN JOINT, ANKLE AND FOOT, LEFT: Primary | ICD-10-CM

## 2019-03-26 NOTE — PROGRESS NOTES
CHIEF COMPLAINT:  Status post left ankle hardware removal performed 02/13/2019.      HISTORY OF PRESENT ILLNESS:  Mr. Gu presents today for followup.  Denies to have any problems or complaints.  Reports to be doing well.      PHYSICAL EXAMINATION:  On today's visit, he presents with full range of motion of the left ankle, hindfoot and midfoot joints.  CMS intact.  Skin intact.  There is a well-healed surgical incision.      ASSESSMENT:  Status post left ankle hardware removal.      PLAN:  Discussed with the patient he is making excellent progress.  He will follow up on a p.r.n. basis.  The patient has no activity restrictions.  He will contact us in 3 months from now if he is not happy with the function of the left ankle.

## 2019-03-26 NOTE — NURSING NOTE
"Reason For Visit:   Chief Complaint   Patient presents with     RECHECK     s/p hardware removal. DOS: 2/13/19       There were no vitals taken for this visit.    Pain Assessment  Patient Currently in Pain: Denies(patient states the leg feels \"odd\" has a hard time trusting it. )    Keyonna Khan ATC    "

## 2019-03-26 NOTE — LETTER
3/26/2019       RE: Jhon Gu  [address]  [address]  [facility] [address]     Dear Colleague,    Thank you for referring your patient, Jhon Gu, to the [facility]. Please see a copy of my visit note below.    CHIEF COMPLAINT:  Status post left ankle hardware removal performed 02/13/2019.      HISTORY OF PRESENT ILLNESS:  Mr. Gu presents today for followup.  Denies to have any problems or complaints.  Reports to be doing well.      PHYSICAL EXAMINATION:  On today's visit, he presents with full range of motion of the left ankle, hindfoot and midfoot joints.  CMS intact.  Skin intact.  There is a well-healed surgical incision.      ASSESSMENT:  Status post left ankle hardware removal.      PLAN:  Discussed with the patient he is making excellent progress.  He will follow up on a p.r.n. basis.  The patient has no activity restrictions.  He will contact us in 3 months from now if he is not happy with the function of the left ankle.     Again, thank you for allowing me to participate in the care of your patient.      Sincerely,    Willy Velasco MD

## 2019-11-07 ENCOUNTER — HEALTH MAINTENANCE LETTER (OUTPATIENT)
Age: 39
End: 2019-11-07

## 2020-12-06 ENCOUNTER — HEALTH MAINTENANCE LETTER (OUTPATIENT)
Age: 40
End: 2020-12-06

## 2021-05-26 ENCOUNTER — RECORDS - HEALTHEAST (OUTPATIENT)
Dept: ADMINISTRATIVE | Facility: CLINIC | Age: 41
End: 2021-05-26

## 2021-09-25 ENCOUNTER — HEALTH MAINTENANCE LETTER (OUTPATIENT)
Age: 41
End: 2021-09-25

## 2022-01-15 ENCOUNTER — HEALTH MAINTENANCE LETTER (OUTPATIENT)
Age: 42
End: 2022-01-15

## 2022-05-17 ENCOUNTER — OFFICE VISIT (OUTPATIENT)
Dept: DERMATOLOGY | Facility: CLINIC | Age: 42
End: 2022-05-17
Payer: COMMERCIAL

## 2022-05-17 DIAGNOSIS — D18.01 CHERRY ANGIOMA: ICD-10-CM

## 2022-05-17 DIAGNOSIS — D22.9 MULTIPLE BENIGN NEVI: ICD-10-CM

## 2022-05-17 DIAGNOSIS — L91.8 SKIN TAG: ICD-10-CM

## 2022-05-17 DIAGNOSIS — L82.1 SEBORRHEIC KERATOSIS: ICD-10-CM

## 2022-05-17 DIAGNOSIS — L73.8 SEBACEOUS HYPERPLASIA: Primary | ICD-10-CM

## 2022-05-17 PROCEDURE — 99203 OFFICE O/P NEW LOW 30 MIN: CPT | Performed by: DERMATOLOGY

## 2022-05-17 RX ORDER — BUPROPION HYDROCHLORIDE 150 MG/1
150 TABLET ORAL DAILY
COMMUNITY
Start: 2022-03-14

## 2022-05-17 ASSESSMENT — PAIN SCALES - GENERAL: PAINLEVEL: NO PAIN (0)

## 2022-05-17 NOTE — PROGRESS NOTES
Dermatology Rooming Note    Jhon Gu's goals for this visit include:   Chief Complaint   Patient presents with     Skin Check     Bill is here for a skin check today with areas of concern on his R arm and L side of back.     MARLI Jenkins

## 2022-05-17 NOTE — LETTER
5/17/2022       RE: Jhon Gu  [address]  [address]  [place] [facility] [address]     Dear Colleague,    Thank you for referring your patient, Jhon Gu, to the [facility] [place] at [facility]. Please see a copy of my visit note below.    [facility] Dermatology Note   Office visit    Encounter Date: May 17, 2022    Dermatology Problem List:  FBSE 05/17/22  # Multiple benign melanocytic nevi  ___________________________________________    ASSESSMENT/PLAN:    # Multiple benign nevi.   - No concerning lesions today  - Monitor for ABCDEs of melanoma   - Continue sun protection - recommend SPF 30 or higher with frequent application   - Return sooner if noticing changing or symptomatic lesions    # Benign lesions: Seborrheic keratoses, cherry angiomas, sebaceous hyperplasia, and skin tags  Discussed the natural history and benign nature of this lesion. Reassurance provided that no additional treatment is necessary.      Procedures Performed:  None    Follow-up: In 2 years for repeat FBSC or with PCP    Scribe Disclosure:  I, Mindy Harper, am serving as a scribe to document services personally performed by Kirt Aguillon MD based on data collection and the provider's statements to me.     Staff attestation:  The documentation recorded by the scribe accurately reflects the services I personally performed and the decisions I personally made. I have made edits where needed.    Kirt Aguillon MD  Staff Dermatologist and Dermatopathologist  , Department of Dermatology  ___________________________________________    HPI:  Mr. Jhon Gu is a(n) 42 year old male presenting for a FBSC. This is his first dermatology visit. Today, the patient points to a few lesions on his forehead and back that he would like examined. He enjoys spending time gardening and wears a large hat  while doing so. No family history of melanoma.     Physical exam:  General: in no acute distress, well-appearing  SKIN: Full skin, which includes the head/face, both arms, chest, back, abdomen,both legs, genitalia and/or groin buttocks, digits and/or nails, was examined.  - There are dome shaped bright red papules on the back.   - Multiple regular brown pigmented macules and papules are identified on the trunk and extremities.   - There are yellow oily papules with central umbilication located on the forehead and cheeks.  - There is(are) skin colored pedunculated papules on the bilateral axilla.   - There are waxy stuck on tan to brown papules on the R back x2.     Medications:  Current Outpatient Medications   Medication     escitalopram (LEXAPRO) 10 MG tablet     fluticasone (FLONASE) 50 MCG/ACT nasal spray     loratadine (CLARITIN) 10 MG tablet     Multiple Vitamins-Minerals (MULTIVITAMIN ADULT PO)     buPROPion (WELLBUTRIN XL) 150 MG 24 hr tablet     hydrOXYzine (ATARAX) 25 MG tablet     ibuprofen (ADVIL) 200 MG tablet     No current facility-administered medications for this visit.      Past Medical/Surgical History:   Patient Active Problem List   Diagnosis     Swelling, mass, or lump in head and neck     Dysphonia     Past Medical History:   Diagnosis Date     Chronic sinusitis 11/16     Past Surgical History:   Procedure Laterality Date     REMOVE HARDWARE ANKLE Left 2/13/2019       Dermatology Rooming Note    Jhon Gu's goals for this visit include:   Chief Complaint   Patient presents with     Skin Check     Bill is here for a skin check today with areas of concern on his R arm and L side of back.     MARLI Jenkins

## 2022-05-17 NOTE — PROGRESS NOTES
[facility] Dermatology Note   Office visit    Encounter Date: May 17, 2022    Dermatology Problem List:  FBSE 05/17/22  # Multiple benign melanocytic nevi  ___________________________________________    ASSESSMENT/PLAN:    # Multiple benign nevi.   - No concerning lesions today  - Monitor for ABCDEs of melanoma   - Continue sun protection - recommend SPF 30 or higher with frequent application   - Return sooner if noticing changing or symptomatic lesions    # Benign lesions: Seborrheic keratoses, cherry angiomas, sebaceous hyperplasia, and skin tags  Discussed the natural history and benign nature of this lesion. Reassurance provided that no additional treatment is necessary.      Procedures Performed:  None    Follow-up: In 2 years for repeat FBSC or with PCP    Scribe Disclosure:  I, Mindy Harper, am serving as a scribe to document services personally performed by Kirt Aguillon MD based on data collection and the provider's statements to me.     Staff attestation:  The documentation recorded by the scribe accurately reflects the services I personally performed and the decisions I personally made. I have made edits where needed.    Kirt Aguillon MD  Staff Dermatologist and Dermatopathologist  , Department of Dermatology  ___________________________________________    HPI:  Mr. Jhon Gu is a(n) 42 year old male presenting for a FBSC. This is his first dermatology visit. Today, the patient points to a few lesions on his forehead and back that he would like examined. He enjoys spending time gardening and wears a large hat while doing so. No family history of melanoma.     Physical exam:  General: in no acute distress, well-appearing  SKIN: Full skin, which includes the head/face, both arms, chest, back, abdomen,both legs, genitalia and/or groin buttocks, digits and/or nails, was examined.  - There are dome shaped bright red papules on the back.   - Multiple  regular brown pigmented macules and papules are identified on the trunk and extremities.   - There are yellow oily papules with central umbilication located on the forehead and cheeks.  - There is(are) skin colored pedunculated papules on the bilateral axilla.   - There are waxy stuck on tan to brown papules on the R back x2.     Medications:  Current Outpatient Medications   Medication     escitalopram (LEXAPRO) 10 MG tablet     fluticasone (FLONASE) 50 MCG/ACT nasal spray     loratadine (CLARITIN) 10 MG tablet     Multiple Vitamins-Minerals (MULTIVITAMIN ADULT PO)     buPROPion (WELLBUTRIN XL) 150 MG 24 hr tablet     hydrOXYzine (ATARAX) 25 MG tablet     ibuprofen (ADVIL) 200 MG tablet     No current facility-administered medications for this visit.      Past Medical/Surgical History:   Patient Active Problem List   Diagnosis     Swelling, mass, or lump in head and neck     Dysphonia     Past Medical History:   Diagnosis Date     Chronic sinusitis 11/16     Past Surgical History:   Procedure Laterality Date     REMOVE HARDWARE ANKLE Left 2/13/2019

## 2022-05-17 NOTE — PATIENT INSTRUCTIONS
Patient Education     Checking for Skin Cancer  You can find cancer early by checking your skin each month. There are 3 kinds of skin cancer. They are melanoma, basal cell carcinoma, and squamous cell carcinoma. Doing monthly skin checks is the best way to find new marks or skin changes. Follow the instructions below for checking your skin.   The ABCDEs of checking moles for melanoma   Check your moles or growths for signs of melanoma using ABCDE:   Asymmetry: the sides of the mole or growth don t match  Border: the edges are ragged, notched, or blurred  Color: the color within the mole or growth varies  Diameter: the mole or growth is larger than 6 mm (size of a pencil eraser)  Evolving: the size, shape, or color of the mole or growth is changing (evolving is not shown in the images below)    Checking for other types of skin cancer  Basal cell carcinoma or squamous cell carcinoma have symptoms such as:     A spot or mole that looks different from all other marks on your skin  Changes in how an area feels, such as itching, tenderness, or pain  Changes in the skin's surface, such as oozing, bleeding, or scaliness  A sore that does not heal  New swelling or redness beyond the border of a mole    Who s at risk?  Anyone can get skin cancer. But you are at greater risk if you have:   Fair skin, light-colored hair, or light-colored eyes  Many moles or abnormal moles on your skin  A history of sunburns from sunlight or tanning beds  A family history of skin cancer  A history of exposure to radiation or chemicals  A weakened immune system  If you have had skin cancer in the past, you are at risk for recurring skin cancer.   How to check your skin  Do your monthly skin checkups in front of a full-length mirror. Check all parts of your body, including your:   Head (ears, face, neck, and scalp)  Torso (front, back, and sides)  Arms (tops, undersides, upper, and lower armpits)  Hands (palms, backs, and fingers, including  under the nails)  Buttocks and genitals  Legs (front, back, and sides)  Feet (tops, soles, toes, including under the nails, and between toes)  If you have a lot of moles, take digital photos of them each month. Make sure to take photos both up close and from a distance. These can help you see if any moles change over time.   Most skin changes are not cancer. But if you see any changes in your skin, call your doctor right away. Only he or she can diagnose a problem. If you have skin cancer, seeing your doctor can be the first step toward getting the treatment that could save your life.   [employer] last reviewed this educational content on 4/1/2019 2000-2020 [employer]. [address] [place] [address], [place] [address]. All rights reserved. This information is not intended as a substitute for professional medical care. Always follow your healthcare professional's instructions.       When should I call my doctor?  If you are worsening or not improving, please, contact us or seek urgent care as noted below.     Who should I call with questions (adults)?  [facility] (adult and pediatric): [phone]  [facility] (adult): [phone]  For urgent needs outside of business hours call the [facility] at [phone] and ask for the dermatology resident on call to be paged  If this is a medical emergency and you are unable to reach an ER, Call [phone]    Who should I call with questions (pediatric)?  [facility]- Pediatric Dermatology  Dr. Aurea Camargo, Dr. Sergio Bailey, Dr. Angie Castanon, KATHIE Campos, Dr. Annelise Banuelos, Dr. Cherelle Jimenez & Dr. Mc Ghosh  Non-urgent nurse triage line; [phone]- Karen and Carlene RICHARDSON Care Coordinatorjames Grullon (/Complex ) [phone]    If you need a prescription refill, please contact your pharmacy. Refills are approved or denied by our  Physicians during normal business hours, Monday through Fridays  Per office policy, refills will not be granted if you have not been seen within the past year (or sooner depending on your child's condition)    Scheduling Information:  Pediatric Appointment Scheduling and Call Center [phone]  Radiology Scheduling- [phone]  Sedation Unit Scheduling- [phone]  Star Scheduling- General [phone]; Pediatric Dermatology [phone]  Main  Services: [phone]  Malay: [phone]  Czech: [phone]  Hmong/Togolese/Malay: [phone]  Preadmission Nursing Department Fax Number: [phone] (Fax all pre-operative paperwork to this number)    For urgent matters arising during evenings, weekends, or holidays that cannot wait for normal business hours please call [phone] and ask for the dermatology resident on call to be paged.

## 2022-11-03 ENCOUNTER — MEDICAL CORRESPONDENCE (OUTPATIENT)
Dept: HEALTH INFORMATION MANAGEMENT | Facility: CLINIC | Age: 42
End: 2022-11-03

## 2022-11-07 ENCOUNTER — TRANSFERRED RECORDS (OUTPATIENT)
Dept: HEALTH INFORMATION MANAGEMENT | Facility: CLINIC | Age: 42
End: 2022-11-07

## 2022-11-08 ENCOUNTER — TRANSCRIBE ORDERS (OUTPATIENT)
Dept: OTHER | Age: 42
End: 2022-11-08

## 2022-11-08 DIAGNOSIS — H66.3X9: Primary | ICD-10-CM

## 2023-01-26 NOTE — PROGRESS NOTES
CHIEF COMPLAINT: Patient presents with:  Ent Problem: Right ear recurrent ear infections, tiny bit of ringing both ears, left ear pain for 2 months, neti pot, flonase         HISTORY OF PRESENT ILLNESS    Balaji was seen at the behest of Wilfredo Glynn MD for chronic otitis media.  He was seen in urgent care 4 months ago and was treated with mulitple courses of drops.  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     Referral note:      Wilfredo Glynn MD   [address] [address]   Phone: [phone]   Fax: [phone]    Diagnoses: Otitis media, chronic purulent   Order: Adult Ent  Referral       Comment: Referred by: Dr. Wilfredo Glynn @ [place]   Ph: [phone] Fx: [phone]   [facility] will call you to coordinate your care as prescribed by the provider. If you don t hear from a representative within 2 business days, please call [phone].          REVIEW OF SYSTEMS    Review of Systems as per HPI and PMHx, otherwise 10 system review system are negative.       ALLERGIES    Latex, Nkda [no known drug allergies], and Penicillins    CURRENT MEDICATIONS      Current Outpatient Medications:      buPROPion (WELLBUTRIN XL) 150 MG 24 hr tablet, Take 150 mg by mouth daily, Disp: , Rfl:      escitalopram (LEXAPRO) 10 MG tablet, , Disp: , Rfl: 0     fluticasone (FLONASE) 50 MCG/ACT nasal spray, Spray 2 sprays into both nostrils daily, Disp: , Rfl:      hydrOXYzine (ATARAX) 25 MG tablet, Take 1 tablet (25 mg) by mouth 3 times daily as needed for itching (Patient not taking: Reported on 5/17/2022), Disp: 30 tablet, Rfl: 0     loratadine (CLARITIN) 10 MG tablet, Take 10 mg by mouth daily (Patient not taking: Reported on 1/27/2023), Disp: , Rfl:      Multiple Vitamins-Minerals (MULTIVITAMIN ADULT PO), , Disp: , Rfl:      PAST MEDICAL HISTORY    PAST MEDICAL HISTORY:   Past Medical History:   Diagnosis Date     Chronic sinusitis  11/16       PAST SURGICAL HISTORY    PAST SURGICAL HISTORY:   Past Surgical History:   Procedure Laterality Date     REMOVE HARDWARE ANKLE Left 2/13/2019    Procedure: Left Ankle Hardware Removal;  Surgeon: Willy Velasco MD;  Location:  OR       FAMILY  HISTORY    FAMILY HISTORY:   Family History   Problem Relation Age of Onset     Diabetes Mother      Hypertension Mother        SOCIAL HISTORY    SOCIAL HISTORY:   Social History     Tobacco Use     Smoking status: Never     Smokeless tobacco: Never   Substance Use Topics     Alcohol use: Yes     Comment: 1-2 drinks twice weekly        PHYSICAL EXAM    HEAD: Normal appearance and symmetry:  No cutaneous lesions.      NECK:  supple     EARS:    Right:   negative, External ears normal. Canals clear. TM's normal.  Cerumen impaction NO   LEFT:  negative, External ears normal. Canals clear. TM's normal.  Cerumen impaction NO    EYES:  EOMI    CN VII/XII:  intact     NOSE:     Dorsum:   straight  Septum:  midline  Mucosa:  moist        ORAL CAVITY/OROPHARYNX:     Lips:  Normal.  Tongue: normal, midline  Mucosa:   no lesions     NECK:  Trachea:  midline.              Thyroid:  normal              Adenopathy:  none        NEURO:   Alert and Oriented     GAIT AND STATION:  normal     RESPIRATORY:   Symmetry and Respiratory effort     PSYCH:  Normal mood and affect     SKIN:   warm and dry       AUDIOGRAM:  WNL    IMPRESSION:    Encounter Diagnosis   Name Primary?     Normal hearing noted on examination Yes          RECOMMENDATIONS:      Discussed water precautions  Return visit as needed  If ear is infected, notify my office via myChart

## 2023-01-27 ENCOUNTER — OFFICE VISIT (OUTPATIENT)
Dept: OTOLARYNGOLOGY | Facility: CLINIC | Age: 43
End: 2023-01-27
Payer: COMMERCIAL

## 2023-01-27 ENCOUNTER — OFFICE VISIT (OUTPATIENT)
Dept: AUDIOLOGY | Facility: CLINIC | Age: 43
End: 2023-01-27
Payer: COMMERCIAL

## 2023-01-27 DIAGNOSIS — H92.09 OTALGIA: Primary | ICD-10-CM

## 2023-01-27 DIAGNOSIS — Z01.10 NORMAL HEARING NOTED ON EXAMINATION: Primary | ICD-10-CM

## 2023-01-27 PROCEDURE — 92557 COMPREHENSIVE HEARING TEST: CPT | Performed by: AUDIOLOGIST

## 2023-01-27 PROCEDURE — 92567 TYMPANOMETRY: CPT | Performed by: AUDIOLOGIST

## 2023-01-27 PROCEDURE — 99203 OFFICE O/P NEW LOW 30 MIN: CPT | Performed by: OTOLARYNGOLOGY

## 2023-01-27 NOTE — PATIENT INSTRUCTIONS
If water in the ear, you can place 2-3 drops of  a 1:1 mixture of isopropyl alcohol and white vinegar.    Keep ears dry when showering (cotton ball covered in vaseline)    Avoid Q tip use.     Return visit as needed  If ear is infected, notify my office via myChart

## 2023-01-27 NOTE — LETTER
1/27/2023         RE: Jhon Gu  [address]  [place] [address]        Dear Colleague,    Thank you for referring your patient, Jhon Gu, to the [facility]. Please see a copy of my visit note below.    CHIEF COMPLAINT: Patient presents with:  Ent Problem: Right ear recurrent ear infections, tiny bit of ringing both ears, left ear pain for 2 months, neti pot, flonase         HISTORY OF PRESENT ILLNESS    Bill was seen at the behest of Wilfredo Glynn MD for chronic otitis media.  He was seen in urgent care 4 months ago and was treated with mulitple courses of drops.  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     Referral note:      Wilfredo Glynn MD   [facility]   [address] [address]   Phone: [phone]   Fax: [phone]    Diagnoses: Otitis media, chronic purulent   Order: Adult Ent  Referral       Comment: Referred by: Dr. Wilfredo Glynn @ [place]   Ph: [phone] Fx: [phone]   [facility] will call you to coordinate your care as prescribed by the provider. If you don t hear from a representative within 2 business days, please call [phone].          REVIEW OF SYSTEMS    Review of Systems as per HPI and PMHx, otherwise 10 system review system are negative.       ALLERGIES    Latex, Nkda [no known drug allergies], and Penicillins    CURRENT MEDICATIONS      Current Outpatient Medications:      buPROPion (WELLBUTRIN XL) 150 MG 24 hr tablet, Take 150 mg by mouth daily, Disp: , Rfl:      escitalopram (LEXAPRO) 10 MG tablet, , Disp: , Rfl: 0     fluticasone (FLONASE) 50 MCG/ACT nasal spray, Spray 2 sprays into both nostrils daily, Disp: , Rfl:      hydrOXYzine (ATARAX) 25 MG tablet, Take 1 tablet (25 mg) by mouth 3 times daily as needed for itching (Patient not taking: Reported on 5/17/2022), Disp: 30 tablet, Rfl: 0     loratadine (CLARITIN) 10 MG tablet, Take 10 mg by  mouth daily (Patient not taking: Reported on 1/27/2023), Disp: , Rfl:      Multiple Vitamins-Minerals (MULTIVITAMIN ADULT PO), , Disp: , Rfl:      PAST MEDICAL HISTORY    PAST MEDICAL HISTORY:   Past Medical History:   Diagnosis Date     Chronic sinusitis 11/16       PAST SURGICAL HISTORY    PAST SURGICAL HISTORY:   Past Surgical History:   Procedure Laterality Date     REMOVE HARDWARE ANKLE Left 2/13/2019    Procedure: Left Ankle Hardware Removal;  Surgeon: Willy Velasco MD;  Location:  OR       FAMILY  HISTORY    FAMILY HISTORY:   Family History   Problem Relation Age of Onset     Diabetes Mother      Hypertension Mother        SOCIAL HISTORY    SOCIAL HISTORY:   Social History     Tobacco Use     Smoking status: Never     Smokeless tobacco: Never   Substance Use Topics     Alcohol use: Yes     Comment: 1-2 drinks twice weekly        PHYSICAL EXAM    HEAD: Normal appearance and symmetry:  No cutaneous lesions.      NECK:  supple     EARS:    Right:   negative, External ears normal. Canals clear. TM's normal.  Cerumen impaction NO   LEFT:  negative, External ears normal. Canals clear. TM's normal.  Cerumen impaction NO    EYES:  EOMI    CN VII/XII:  intact     NOSE:     Dorsum:   straight  Septum:  midline  Mucosa:  moist        ORAL CAVITY/OROPHARYNX:     Lips:  Normal.  Tongue: normal, midline  Mucosa:   no lesions     NECK:  Trachea:  midline.              Thyroid:  normal              Adenopathy:  none        NEURO:   Alert and Oriented     GAIT AND STATION:  normal     RESPIRATORY:   Symmetry and Respiratory effort     PSYCH:  Normal mood and affect     SKIN:   warm and dry       AUDIOGRAM:  WNL    IMPRESSION:    Encounter Diagnosis   Name Primary?     Normal hearing noted on examination Yes          RECOMMENDATIONS:      Discussed water precautions  Return visit as needed  If ear is infected, notify my office via myChart      Again, thank you for allowing me to participate in the care of your  patient.        Sincerely,        Tonio Weiss MD

## 2023-01-27 NOTE — PROGRESS NOTES
AUDIOLOGY REPORT    SUMMARY: Audiology visit completed. See audiogram for results.      RECOMMENDATIONS: Follow-up with ENT.    Jose Ryder, CCC-A  Clinical Audiologist  [place] #[id number]

## 2023-04-22 ENCOUNTER — HEALTH MAINTENANCE LETTER (OUTPATIENT)
Age: 43
End: 2023-04-22

## 2023-05-17 ENCOUNTER — TELEPHONE (OUTPATIENT)
Dept: DERMATOLOGY | Facility: CLINIC | Age: 43
End: 2023-05-17
Payer: COMMERCIAL

## 2023-05-17 NOTE — TELEPHONE ENCOUNTER
Writer spoke with patient regarding scheduling his 2 year f/up with Dr. Aguillon. Pt stated he didn't feel it was needed and declined to schedule.

## 2024-06-23 ENCOUNTER — HEALTH MAINTENANCE LETTER (OUTPATIENT)
Age: 44
End: 2024-06-23

## 2025-07-12 ENCOUNTER — HEALTH MAINTENANCE LETTER (OUTPATIENT)
Age: 45
End: 2025-07-12

## (undated) DEVICE — SOL NACL 0.9% IRRIG 1000ML BOTTLE 2F7124

## (undated) DEVICE — PACK LOWER EXTREMITY CUSTOM ASC

## (undated) DEVICE — GOWN XLG DISP 9545

## (undated) DEVICE — GLOVE PROTEXIS POWDER FREE SMT 7.5  2D72PT75X

## (undated) DEVICE — GLOVE PROTEXIS BLUE W/NEU-THERA 8.0  2D73EB80

## (undated) DEVICE — LINEN GOWN XLG 5407

## (undated) DEVICE — ESU GROUND PAD ADULT W/CORD E7507

## (undated) DEVICE — GLOVE PROTEXIS MICRO 6.5  2D73PM65

## (undated) DEVICE — IMM LEG ELEVATOR 79-90191

## (undated) DEVICE — BLADE KNIFE SURG 10 371110

## (undated) DEVICE — LINEN ORTHO PACK 5446

## (undated) DEVICE — BNDG ELASTIC 4"X5YDS UNSTERILE 6611-40

## (undated) DEVICE — SUCTION MANIFOLD NEPTUNE 2 SYS 1 PORT 702-025-000

## (undated) DEVICE — SU VICRYL 2-0 CT-2 27" UND J269H

## (undated) DEVICE — SU ETHILON 3-0 PS-1 18" 1663G

## (undated) RX ORDER — OXYCODONE HYDROCHLORIDE 5 MG/1
TABLET ORAL
Status: DISPENSED
Start: 2019-02-13

## (undated) RX ORDER — CLINDAMYCIN PHOSPHATE 900 MG/50ML
INJECTION, SOLUTION INTRAVENOUS
Status: DISPENSED
Start: 2019-02-13

## (undated) RX ORDER — ACETAMINOPHEN 325 MG/1
TABLET ORAL
Status: DISPENSED
Start: 2019-02-13

## (undated) RX ORDER — BUPIVACAINE HYDROCHLORIDE 5 MG/ML
INJECTION, SOLUTION EPIDURAL; INTRACAUDAL
Status: DISPENSED
Start: 2019-02-13

## (undated) RX ORDER — FENTANYL CITRATE 50 UG/ML
INJECTION, SOLUTION INTRAMUSCULAR; INTRAVENOUS
Status: DISPENSED
Start: 2019-02-13

## (undated) RX ORDER — GABAPENTIN 300 MG/1
CAPSULE ORAL
Status: DISPENSED
Start: 2019-02-13